# Patient Record
Sex: MALE | Race: AMERICAN INDIAN OR ALASKA NATIVE | NOT HISPANIC OR LATINO
[De-identification: names, ages, dates, MRNs, and addresses within clinical notes are randomized per-mention and may not be internally consistent; named-entity substitution may affect disease eponyms.]

---

## 2017-01-30 ENCOUNTER — RX RENEWAL (OUTPATIENT)
Age: 57
End: 2017-01-30

## 2017-02-01 ENCOUNTER — APPOINTMENT (OUTPATIENT)
Dept: OPHTHALMOLOGY | Facility: CLINIC | Age: 57
End: 2017-02-01

## 2017-03-02 ENCOUNTER — APPOINTMENT (OUTPATIENT)
Dept: VASCULAR SURGERY | Facility: CLINIC | Age: 57
End: 2017-03-02

## 2017-03-02 VITALS
BODY MASS INDEX: 29.55 KG/M2 | HEIGHT: 68 IN | HEART RATE: 71 BPM | DIASTOLIC BLOOD PRESSURE: 75 MMHG | TEMPERATURE: 98.6 F | WEIGHT: 195 LBS | SYSTOLIC BLOOD PRESSURE: 119 MMHG

## 2017-03-02 VITALS — DIASTOLIC BLOOD PRESSURE: 81 MMHG | RESPIRATION RATE: 73 BRPM | SYSTOLIC BLOOD PRESSURE: 124 MMHG

## 2017-03-02 DIAGNOSIS — I86.8 VARICOSE VEINS OF OTHER SPECIFIED SITES: ICD-10-CM

## 2017-04-28 ENCOUNTER — APPOINTMENT (OUTPATIENT)
Dept: INTERNAL MEDICINE | Facility: CLINIC | Age: 57
End: 2017-04-28

## 2017-04-28 VITALS
TEMPERATURE: 98.2 F | SYSTOLIC BLOOD PRESSURE: 120 MMHG | DIASTOLIC BLOOD PRESSURE: 84 MMHG | HEIGHT: 68 IN | HEART RATE: 82 BPM | RESPIRATION RATE: 16 BRPM | WEIGHT: 201 LBS | BODY MASS INDEX: 30.46 KG/M2

## 2017-04-29 DIAGNOSIS — H40.9 UNSPECIFIED GLAUCOMA: ICD-10-CM

## 2017-04-29 LAB
25(OH)D3 SERPL-MCNC: 55.6 NG/ML
ALBUMIN SERPL ELPH-MCNC: 4.3 G/DL
ALP BLD-CCNC: 73 U/L
ALT SERPL-CCNC: 18 U/L
ANION GAP SERPL CALC-SCNC: 14 MMOL/L
APPEARANCE: CLEAR
AST SERPL-CCNC: 19 U/L
BASOPHILS # BLD AUTO: 0.03 K/UL
BASOPHILS NFR BLD AUTO: 0.4 %
BILIRUB SERPL-MCNC: 0.6 MG/DL
BILIRUBIN URINE: NEGATIVE
BLOOD URINE: NEGATIVE
BUN SERPL-MCNC: 7 MG/DL
CALCIUM SERPL-MCNC: 9.4 MG/DL
CHLORIDE SERPL-SCNC: 101 MMOL/L
CHOLEST SERPL-MCNC: 150 MG/DL
CHOLEST/HDLC SERPL: 3.8 RATIO
CO2 SERPL-SCNC: 27 MMOL/L
COLOR: YELLOW
CREAT SERPL-MCNC: 0.93 MG/DL
CREAT SPEC-SCNC: 93 MG/DL
EOSINOPHIL # BLD AUTO: 0.1 K/UL
EOSINOPHIL NFR BLD AUTO: 1.5 %
ERYTHROCYTE [SEDIMENTATION RATE] IN BLOOD BY WESTERGREN METHOD: 12 MM/HR
FOLATE SERPL-MCNC: >20 NG/ML
GGT SERPL-CCNC: 14 U/L
GLUCOSE QUALITATIVE U: NORMAL MG/DL
GLUCOSE SERPL-MCNC: 91 MG/DL
HBA1C MFR BLD HPLC: 6 %
HCT VFR BLD CALC: 42.9 %
HDLC SERPL-MCNC: 40 MG/DL
HGB BLD-MCNC: 14.2 G/DL
IMM GRANULOCYTES NFR BLD AUTO: 0.1 %
IRON SATN MFR SERPL: 30 %
IRON SERPL-MCNC: 101 UG/DL
KETONES URINE: NEGATIVE
LDLC SERPL CALC-MCNC: 83 MG/DL
LEUKOCYTE ESTERASE URINE: NEGATIVE
LYMPHOCYTES # BLD AUTO: 3.23 K/UL
LYMPHOCYTES NFR BLD AUTO: 48.1 %
MAN DIFF?: NORMAL
MCHC RBC-ENTMCNC: 29.5 PG
MCHC RBC-ENTMCNC: 33.1 GM/DL
MCV RBC AUTO: 89 FL
MICROALBUMIN 24H UR DL<=1MG/L-MCNC: 0.4 MG/DL
MICROALBUMIN/CREAT 24H UR-RTO: 4 UG/MG
MONOCYTES # BLD AUTO: 0.39 K/UL
MONOCYTES NFR BLD AUTO: 5.8 %
NEUTROPHILS # BLD AUTO: 2.95 K/UL
NEUTROPHILS NFR BLD AUTO: 44.1 %
NITRITE URINE: NEGATIVE
PH URINE: 7
PLATELET # BLD AUTO: 197 K/UL
POTASSIUM SERPL-SCNC: 4.6 MMOL/L
PROT SERPL-MCNC: 7 G/DL
PROTEIN URINE: NEGATIVE MG/DL
PSA FREE FLD-MCNC: 13.1 %
PSA FREE SERPL-MCNC: 0.63 NG/ML
PSA SERPL-MCNC: 4.83 NG/ML
RBC # BLD: 4.82 M/UL
RBC # FLD: 13.6 %
SODIUM SERPL-SCNC: 142 MMOL/L
SPECIFIC GRAVITY URINE: 1.01
T3 SERPL-MCNC: 141 NG/DL
T4 FREE SERPL-MCNC: 1.2 NG/DL
TIBC SERPL-MCNC: 334 UG/DL
TRIGL SERPL-MCNC: 133 MG/DL
TSH SERPL-ACNC: 1.68 UIU/ML
UIBC SERPL-MCNC: 233 UG/DL
UROBILINOGEN URINE: NORMAL MG/DL
VIT B12 SERPL-MCNC: >2000 PG/ML
WBC # FLD AUTO: 6.71 K/UL

## 2017-05-03 LAB
TESTOST BND SERPL-MCNC: 3.8 PG/ML
TESTOST SERPL-MCNC: 401.3 NG/DL

## 2017-05-30 ENCOUNTER — APPOINTMENT (OUTPATIENT)
Dept: ORTHOPEDIC SURGERY | Facility: CLINIC | Age: 57
End: 2017-05-30

## 2017-05-30 ENCOUNTER — OUTPATIENT (OUTPATIENT)
Dept: OUTPATIENT SERVICES | Facility: HOSPITAL | Age: 57
LOS: 1 days | End: 2017-05-30
Payer: COMMERCIAL

## 2017-05-30 ENCOUNTER — APPOINTMENT (OUTPATIENT)
Dept: CARDIOLOGY | Facility: CLINIC | Age: 57
End: 2017-05-30

## 2017-05-30 VITALS
HEIGHT: 70 IN | SYSTOLIC BLOOD PRESSURE: 112 MMHG | OXYGEN SATURATION: 98 % | RESPIRATION RATE: 16 BRPM | DIASTOLIC BLOOD PRESSURE: 72 MMHG | BODY MASS INDEX: 28.35 KG/M2 | WEIGHT: 198 LBS | HEART RATE: 69 BPM

## 2017-05-30 VITALS — BODY MASS INDEX: 28.35 KG/M2 | WEIGHT: 198 LBS | HEIGHT: 70 IN

## 2017-05-30 VITALS — DIASTOLIC BLOOD PRESSURE: 71 MMHG | SYSTOLIC BLOOD PRESSURE: 104 MMHG | HEART RATE: 67 BPM

## 2017-05-30 DIAGNOSIS — Z00.8 ENCOUNTER FOR OTHER GENERAL EXAMINATION: ICD-10-CM

## 2017-05-30 DIAGNOSIS — M21.42 FLAT FOOT [PES PLANUS] (ACQUIRED), RIGHT FOOT: ICD-10-CM

## 2017-05-30 DIAGNOSIS — M21.41 FLAT FOOT [PES PLANUS] (ACQUIRED), RIGHT FOOT: ICD-10-CM

## 2017-05-30 PROCEDURE — G0463: CPT | Mod: 25

## 2017-05-30 PROCEDURE — 93005 ELECTROCARDIOGRAM TRACING: CPT

## 2017-05-30 PROCEDURE — 93010 ELECTROCARDIOGRAM REPORT: CPT

## 2017-06-01 DIAGNOSIS — I10 ESSENTIAL (PRIMARY) HYPERTENSION: ICD-10-CM

## 2017-06-01 DIAGNOSIS — R06.09 OTHER FORMS OF DYSPNEA: ICD-10-CM

## 2017-06-02 RX ORDER — DICLOFENAC SODIUM 10 MG/G
1 GEL TOPICAL
Qty: 1 | Refills: 1 | Status: ACTIVE | COMMUNITY
Start: 2017-06-02 | End: 1900-01-01

## 2017-06-14 ENCOUNTER — APPOINTMENT (OUTPATIENT)
Dept: OPHTHALMOLOGY | Facility: CLINIC | Age: 57
End: 2017-06-14

## 2017-06-27 ENCOUNTER — APPOINTMENT (OUTPATIENT)
Dept: OPHTHALMOLOGY | Facility: CLINIC | Age: 57
End: 2017-06-27

## 2017-06-30 ENCOUNTER — APPOINTMENT (OUTPATIENT)
Dept: MRI IMAGING | Facility: CLINIC | Age: 57
End: 2017-06-30

## 2017-06-30 ENCOUNTER — OUTPATIENT (OUTPATIENT)
Dept: OUTPATIENT SERVICES | Facility: HOSPITAL | Age: 57
LOS: 1 days | End: 2017-06-30
Payer: COMMERCIAL

## 2017-06-30 DIAGNOSIS — R97.20 ELEVATED PROSTATE SPECIFIC ANTIGEN [PSA]: ICD-10-CM

## 2017-06-30 PROCEDURE — 72197 MRI PELVIS W/O & W/DYE: CPT

## 2017-06-30 PROCEDURE — A9585: CPT

## 2017-06-30 PROCEDURE — 82565 ASSAY OF CREATININE: CPT

## 2017-07-01 ENCOUNTER — RX RENEWAL (OUTPATIENT)
Age: 57
End: 2017-07-01

## 2017-07-30 ENCOUNTER — APPOINTMENT (OUTPATIENT)
Dept: MRI IMAGING | Facility: HOSPITAL | Age: 57
End: 2017-07-30
Payer: COMMERCIAL

## 2017-07-30 ENCOUNTER — OUTPATIENT (OUTPATIENT)
Dept: OUTPATIENT SERVICES | Facility: HOSPITAL | Age: 57
LOS: 1 days | End: 2017-07-30
Payer: COMMERCIAL

## 2017-07-30 DIAGNOSIS — M76.822 POSTERIOR TIBIAL TENDINITIS, LEFT LEG: ICD-10-CM

## 2017-07-30 PROCEDURE — 73721 MRI JNT OF LWR EXTRE W/O DYE: CPT

## 2017-07-30 PROCEDURE — 73721 MRI JNT OF LWR EXTRE W/O DYE: CPT | Mod: 26,LT

## 2017-09-08 ENCOUNTER — OUTPATIENT (OUTPATIENT)
Dept: OUTPATIENT SERVICES | Facility: HOSPITAL | Age: 57
LOS: 1 days | Discharge: HOME | End: 2017-09-08
Payer: COMMERCIAL

## 2017-09-08 ENCOUNTER — RX RENEWAL (OUTPATIENT)
Age: 57
End: 2017-09-08

## 2017-09-08 VITALS
SYSTOLIC BLOOD PRESSURE: 124 MMHG | OXYGEN SATURATION: 99 % | DIASTOLIC BLOOD PRESSURE: 75 MMHG | HEART RATE: 70 BPM | TEMPERATURE: 98 F | WEIGHT: 199.3 LBS | HEIGHT: 70 IN | RESPIRATION RATE: 18 BRPM

## 2017-09-08 VITALS
DIASTOLIC BLOOD PRESSURE: 75 MMHG | HEART RATE: 70 BPM | OXYGEN SATURATION: 99 % | HEIGHT: 70 IN | TEMPERATURE: 98 F | WEIGHT: 199.3 LBS | RESPIRATION RATE: 18 BRPM | SYSTOLIC BLOOD PRESSURE: 124 MMHG

## 2017-09-08 DIAGNOSIS — M66.372 SPONTANEOUS RUPTURE OF FLEXOR TENDONS, LEFT ANKLE AND FOOT: ICD-10-CM

## 2017-09-08 DIAGNOSIS — Z98.890 OTHER SPECIFIED POSTPROCEDURAL STATES: Chronic | ICD-10-CM

## 2017-09-08 DIAGNOSIS — M76.822 POSTERIOR TIBIAL TENDINITIS, LEFT LEG: ICD-10-CM

## 2017-09-08 DIAGNOSIS — Z01.818 ENCOUNTER FOR OTHER PREPROCEDURAL EXAMINATION: ICD-10-CM

## 2017-09-08 DIAGNOSIS — I10 ESSENTIAL (PRIMARY) HYPERTENSION: ICD-10-CM

## 2017-09-08 DIAGNOSIS — H40.1130 PRIMARY OPEN-ANGLE GLAUCOMA, BILATERAL, STAGE UNSPECIFIED: ICD-10-CM

## 2017-09-08 DIAGNOSIS — M21.072 VALGUS DEFORMITY, NOT ELSEWHERE CLASSIFIED, LEFT ANKLE: ICD-10-CM

## 2017-09-08 DIAGNOSIS — N40.0 BENIGN PROSTATIC HYPERPLASIA WITHOUT LOWER URINARY TRACT SYMPTOMS: ICD-10-CM

## 2017-09-08 DIAGNOSIS — E78.5 HYPERLIPIDEMIA, UNSPECIFIED: ICD-10-CM

## 2017-09-08 DIAGNOSIS — R52 PAIN, UNSPECIFIED: ICD-10-CM

## 2017-09-08 LAB
ANION GAP SERPL CALC-SCNC: 7 MMOL/L — SIGNIFICANT CHANGE UP (ref 5–17)
BUN SERPL-MCNC: 8 MG/DL — SIGNIFICANT CHANGE UP (ref 7–23)
CALCIUM SERPL-MCNC: 9.2 MG/DL — SIGNIFICANT CHANGE UP (ref 8.5–10.1)
CHLORIDE SERPL-SCNC: 104 MMOL/L — SIGNIFICANT CHANGE UP (ref 96–108)
CO2 SERPL-SCNC: 28 MMOL/L — SIGNIFICANT CHANGE UP (ref 22–31)
CREAT SERPL-MCNC: 0.88 MG/DL — SIGNIFICANT CHANGE UP (ref 0.5–1.3)
GLUCOSE SERPL-MCNC: 94 MG/DL — SIGNIFICANT CHANGE UP (ref 70–99)
HCT VFR BLD CALC: 46.8 % — SIGNIFICANT CHANGE UP (ref 39–50)
HGB BLD-MCNC: 15.4 G/DL — SIGNIFICANT CHANGE UP (ref 13–17)
MCHC RBC-ENTMCNC: 30.9 PG — SIGNIFICANT CHANGE UP (ref 27–34)
MCHC RBC-ENTMCNC: 33 GM/DL — SIGNIFICANT CHANGE UP (ref 32–36)
MCV RBC AUTO: 93.6 FL — SIGNIFICANT CHANGE UP (ref 80–100)
PLATELET # BLD AUTO: 178 K/UL — SIGNIFICANT CHANGE UP (ref 150–400)
POTASSIUM SERPL-MCNC: 4.7 MMOL/L — SIGNIFICANT CHANGE UP (ref 3.5–5.3)
POTASSIUM SERPL-SCNC: 4.7 MMOL/L — SIGNIFICANT CHANGE UP (ref 3.5–5.3)
RBC # BLD: 5 M/UL — SIGNIFICANT CHANGE UP (ref 4.2–5.8)
RBC # FLD: 12.1 % — SIGNIFICANT CHANGE UP (ref 11–15)
SODIUM SERPL-SCNC: 139 MMOL/L — SIGNIFICANT CHANGE UP (ref 135–145)
WBC # BLD: 6.2 K/UL — SIGNIFICANT CHANGE UP (ref 3.8–10.5)
WBC # FLD AUTO: 6.2 K/UL — SIGNIFICANT CHANGE UP (ref 3.8–10.5)

## 2017-09-08 PROCEDURE — 93010 ELECTROCARDIOGRAM REPORT: CPT | Mod: NC

## 2017-09-08 RX ORDER — ASPIRIN/CALCIUM CARB/MAGNESIUM 324 MG
2 TABLET ORAL
Qty: 0 | Refills: 0 | COMMUNITY

## 2017-09-08 NOTE — H&P PST ADULT - NSANTHOSAYNRD_GEN_A_CORE
No. LIZ screening performed.  STOP BANG Legend: 0-2 = LOW Risk; 3-4 = INTERMEDIATE Risk; 5-8 = HIGH Risk

## 2017-09-08 NOTE — H&P PST ADULT - PMH
Benign prostatic hyperplasia, presence of lower urinary tract symptoms unspecified    Essential hypertension    Hyperlipidemia, unspecified hyperlipidemia type    Primary open angle glaucoma of both eyes, unspecified glaucoma stage

## 2017-09-08 NOTE — H&P PST ADULT - HISTORY OF PRESENT ILLNESS
57 year old male, PMH HTN, HLD, BPH, Glaucoma presents with left ankle pain 8/10 intermittent and started 6 months ago. pain is aggravated by ambulation.

## 2017-09-11 ENCOUNTER — TRANSCRIPTION ENCOUNTER (OUTPATIENT)
Age: 57
End: 2017-09-11

## 2017-09-11 RX ORDER — MELOXICAM 7.5 MG/1
7.5 TABLET ORAL
Qty: 30 | Refills: 0 | Status: DISCONTINUED | COMMUNITY
Start: 2017-05-30 | End: 2017-09-11

## 2017-09-18 ENCOUNTER — INPATIENT (INPATIENT)
Facility: HOSPITAL | Age: 57
LOS: 2 days | Discharge: HOME | End: 2017-09-21
Attending: INTERNAL MEDICINE | Admitting: INTERNAL MEDICINE
Payer: COMMERCIAL

## 2017-09-18 ENCOUNTER — RESULT REVIEW (OUTPATIENT)
Age: 57
End: 2017-09-18

## 2017-09-18 VITALS
HEIGHT: 67 IN | RESPIRATION RATE: 18 BRPM | OXYGEN SATURATION: 100 % | HEART RATE: 74 BPM | DIASTOLIC BLOOD PRESSURE: 71 MMHG | TEMPERATURE: 98 F | SYSTOLIC BLOOD PRESSURE: 135 MMHG | WEIGHT: 199.3 LBS

## 2017-09-18 DIAGNOSIS — Z98.890 OTHER SPECIFIED POSTPROCEDURAL STATES: Chronic | ICD-10-CM

## 2017-09-18 PROCEDURE — 88311 DECALCIFY TISSUE: CPT | Mod: 26

## 2017-09-18 PROCEDURE — 88304 TISSUE EXAM BY PATHOLOGIST: CPT | Mod: 26

## 2017-09-18 RX ORDER — CEFAZOLIN SODIUM 1 G
1000 VIAL (EA) INJECTION EVERY 8 HOURS
Qty: 0 | Refills: 0 | Status: DISCONTINUED | OUTPATIENT
Start: 2017-09-19 | End: 2017-09-21

## 2017-09-18 RX ORDER — OXYCODONE AND ACETAMINOPHEN 5; 325 MG/1; MG/1
1 TABLET ORAL EVERY 4 HOURS
Qty: 0 | Refills: 0 | Status: DISCONTINUED | OUTPATIENT
Start: 2017-09-18 | End: 2017-09-21

## 2017-09-18 RX ORDER — MORPHINE SULFATE 50 MG/1
2 CAPSULE, EXTENDED RELEASE ORAL EVERY 4 HOURS
Qty: 0 | Refills: 0 | Status: DISCONTINUED | OUTPATIENT
Start: 2017-09-18 | End: 2017-09-19

## 2017-09-18 RX ORDER — ACETAMINOPHEN 500 MG
650 TABLET ORAL EVERY 6 HOURS
Qty: 0 | Refills: 0 | Status: DISCONTINUED | OUTPATIENT
Start: 2017-09-18 | End: 2017-09-18

## 2017-09-18 RX ORDER — ACETAMINOPHEN 500 MG
1000 TABLET ORAL ONCE
Qty: 0 | Refills: 0 | Status: COMPLETED | OUTPATIENT
Start: 2017-09-18 | End: 2017-09-19

## 2017-09-18 RX ORDER — ONDANSETRON 8 MG/1
4 TABLET, FILM COATED ORAL ONCE
Qty: 0 | Refills: 0 | Status: DISCONTINUED | OUTPATIENT
Start: 2017-09-18 | End: 2017-09-19

## 2017-09-18 RX ORDER — MORPHINE SULFATE 50 MG/1
2 CAPSULE, EXTENDED RELEASE ORAL EVERY 6 HOURS
Qty: 0 | Refills: 0 | Status: DISCONTINUED | OUTPATIENT
Start: 2017-09-18 | End: 2017-09-18

## 2017-09-18 RX ORDER — CEFAZOLIN SODIUM 1 G
VIAL (EA) INJECTION
Qty: 0 | Refills: 0 | Status: DISCONTINUED | OUTPATIENT
Start: 2017-09-19 | End: 2017-09-21

## 2017-09-18 RX ORDER — MORPHINE SULFATE 50 MG/1
4 CAPSULE, EXTENDED RELEASE ORAL ONCE
Qty: 0 | Refills: 0 | Status: DISCONTINUED | OUTPATIENT
Start: 2017-09-18 | End: 2017-09-18

## 2017-09-18 RX ORDER — FENTANYL CITRATE 50 UG/ML
50 INJECTION INTRAVENOUS
Qty: 0 | Refills: 0 | Status: DISCONTINUED | OUTPATIENT
Start: 2017-09-18 | End: 2017-09-19

## 2017-09-18 RX ORDER — FENTANYL CITRATE 50 UG/ML
25 INJECTION INTRAVENOUS
Qty: 0 | Refills: 0 | Status: DISCONTINUED | OUTPATIENT
Start: 2017-09-18 | End: 2017-09-19

## 2017-09-18 RX ORDER — CHOLECALCIFEROL (VITAMIN D3) 125 MCG
1 CAPSULE ORAL
Qty: 0 | Refills: 0 | COMMUNITY

## 2017-09-18 RX ORDER — SODIUM CHLORIDE 9 MG/ML
3 INJECTION INTRAMUSCULAR; INTRAVENOUS; SUBCUTANEOUS EVERY 8 HOURS
Qty: 0 | Refills: 0 | Status: DISCONTINUED | OUTPATIENT
Start: 2017-09-18 | End: 2017-09-18

## 2017-09-18 RX ORDER — SODIUM CHLORIDE 9 MG/ML
1000 INJECTION, SOLUTION INTRAVENOUS
Qty: 0 | Refills: 0 | Status: DISCONTINUED | OUTPATIENT
Start: 2017-09-18 | End: 2017-09-19

## 2017-09-18 RX ORDER — CEFAZOLIN SODIUM 1 G
1000 VIAL (EA) INJECTION ONCE
Qty: 0 | Refills: 0 | Status: COMPLETED | OUTPATIENT
Start: 2017-09-18 | End: 2017-09-19

## 2017-09-18 RX ADMIN — SODIUM CHLORIDE 100 MILLILITER(S): 9 INJECTION, SOLUTION INTRAVENOUS at 23:30

## 2017-09-18 NOTE — PROVIDER CONTACT NOTE (OTHER) - BACKGROUND
Patient is s/p left foot Medical calcaneal slide and posterior tendon advancement and augmentation with FDL and tenodesis

## 2017-09-18 NOTE — BRIEF OPERATIVE NOTE - OPERATION/FINDINGS
Left foot calcaneal slide with 1 x 7.5 45mm Integra screw fixation, Left foot accessory navicular excision and FDL tendon augmentation with 5.5 Arthrex tenodesis screw interposition and reapproximation and advancement of PT tendon

## 2017-09-19 DIAGNOSIS — S99.922S UNSPECIFIED INJURY OF LEFT FOOT, SEQUELA: ICD-10-CM

## 2017-09-19 DIAGNOSIS — I10 ESSENTIAL (PRIMARY) HYPERTENSION: ICD-10-CM

## 2017-09-19 PROCEDURE — 73630 X-RAY EXAM OF FOOT: CPT | Mod: 26,LT

## 2017-09-19 RX ORDER — AMLODIPINE BESYLATE 2.5 MG/1
5 TABLET ORAL DAILY
Qty: 0 | Refills: 0 | Status: DISCONTINUED | OUTPATIENT
Start: 2017-09-19 | End: 2017-09-21

## 2017-09-19 RX ORDER — ENOXAPARIN SODIUM 100 MG/ML
40 INJECTION SUBCUTANEOUS
Qty: 0 | Refills: 0 | Status: DISCONTINUED | OUTPATIENT
Start: 2017-09-19 | End: 2017-09-19

## 2017-09-19 RX ORDER — ATORVASTATIN CALCIUM 80 MG/1
10 TABLET, FILM COATED ORAL AT BEDTIME
Qty: 0 | Refills: 0 | Status: DISCONTINUED | OUTPATIENT
Start: 2017-09-19 | End: 2017-09-21

## 2017-09-19 RX ORDER — LATANOPROST 0.05 MG/ML
1 SOLUTION/ DROPS OPHTHALMIC; TOPICAL AT BEDTIME
Qty: 0 | Refills: 0 | Status: DISCONTINUED | OUTPATIENT
Start: 2017-09-19 | End: 2017-09-21

## 2017-09-19 RX ORDER — MORPHINE SULFATE 50 MG/1
4 CAPSULE, EXTENDED RELEASE ORAL EVERY 6 HOURS
Qty: 0 | Refills: 0 | Status: DISCONTINUED | OUTPATIENT
Start: 2017-09-19 | End: 2017-09-21

## 2017-09-19 RX ORDER — TAMSULOSIN HYDROCHLORIDE 0.4 MG/1
0.4 CAPSULE ORAL AT BEDTIME
Qty: 0 | Refills: 0 | Status: DISCONTINUED | OUTPATIENT
Start: 2017-09-19 | End: 2017-09-21

## 2017-09-19 RX ORDER — ENOXAPARIN SODIUM 100 MG/ML
40 INJECTION SUBCUTANEOUS DAILY
Qty: 0 | Refills: 0 | Status: DISCONTINUED | OUTPATIENT
Start: 2017-09-19 | End: 2017-09-21

## 2017-09-19 RX ADMIN — MORPHINE SULFATE 2 MILLIGRAM(S): 50 CAPSULE, EXTENDED RELEASE ORAL at 13:45

## 2017-09-19 RX ADMIN — Medication 100 MILLIGRAM(S): at 01:14

## 2017-09-19 RX ADMIN — FENTANYL CITRATE 50 MICROGRAM(S): 50 INJECTION INTRAVENOUS at 00:22

## 2017-09-19 RX ADMIN — Medication 100 MILLIGRAM(S): at 13:11

## 2017-09-19 RX ADMIN — TAMSULOSIN HYDROCHLORIDE 0.4 MILLIGRAM(S): 0.4 CAPSULE ORAL at 21:04

## 2017-09-19 RX ADMIN — MORPHINE SULFATE 2 MILLIGRAM(S): 50 CAPSULE, EXTENDED RELEASE ORAL at 07:46

## 2017-09-19 RX ADMIN — Medication 500 MILLIGRAM(S): at 21:48

## 2017-09-19 RX ADMIN — Medication 100 MILLIGRAM(S): at 21:50

## 2017-09-19 RX ADMIN — MORPHINE SULFATE 2 MILLIGRAM(S): 50 CAPSULE, EXTENDED RELEASE ORAL at 08:10

## 2017-09-19 RX ADMIN — MORPHINE SULFATE 2 MILLIGRAM(S): 50 CAPSULE, EXTENDED RELEASE ORAL at 03:24

## 2017-09-19 RX ADMIN — MORPHINE SULFATE 4 MILLIGRAM(S): 50 CAPSULE, EXTENDED RELEASE ORAL at 21:03

## 2017-09-19 RX ADMIN — MORPHINE SULFATE 2 MILLIGRAM(S): 50 CAPSULE, EXTENDED RELEASE ORAL at 14:15

## 2017-09-19 RX ADMIN — Medication 100 MILLIGRAM(S): at 04:33

## 2017-09-19 RX ADMIN — MORPHINE SULFATE 4 MILLIGRAM(S): 50 CAPSULE, EXTENDED RELEASE ORAL at 00:47

## 2017-09-19 RX ADMIN — Medication 500 MILLIGRAM(S): at 22:48

## 2017-09-19 RX ADMIN — FENTANYL CITRATE 50 MICROGRAM(S): 50 INJECTION INTRAVENOUS at 00:48

## 2017-09-19 RX ADMIN — MORPHINE SULFATE 4 MILLIGRAM(S): 50 CAPSULE, EXTENDED RELEASE ORAL at 01:00

## 2017-09-19 RX ADMIN — MORPHINE SULFATE 2 MILLIGRAM(S): 50 CAPSULE, EXTENDED RELEASE ORAL at 02:28

## 2017-09-19 RX ADMIN — Medication 400 MILLIGRAM(S): at 00:10

## 2017-09-19 RX ADMIN — ATORVASTATIN CALCIUM 10 MILLIGRAM(S): 80 TABLET, FILM COATED ORAL at 21:50

## 2017-09-19 RX ADMIN — Medication 1000 MILLIGRAM(S): at 00:27

## 2017-09-19 RX ADMIN — MORPHINE SULFATE 4 MILLIGRAM(S): 50 CAPSULE, EXTENDED RELEASE ORAL at 21:18

## 2017-09-19 RX ADMIN — LATANOPROST 1 DROP(S): 0.05 SOLUTION/ DROPS OPHTHALMIC; TOPICAL at 21:48

## 2017-09-19 RX ADMIN — ENOXAPARIN SODIUM 40 MILLIGRAM(S): 100 INJECTION SUBCUTANEOUS at 18:42

## 2017-09-19 NOTE — PROGRESS NOTE ADULT - SUBJECTIVE AND OBJECTIVE BOX
Patient is a 57y old  Male who presents with a chief complaint of left foot pain (18 Sep 2017 12:10)      INTERVAL HPI/OVERNIGHT EVENTS:  no new episode  MEDICATIONS  (STANDING):  ceFAZolin   IVPB      ceFAZolin   IVPB 1000 milliGRAM(s) IV Intermittent every 8 hours  amLODIPine   Tablet 5 milliGRAM(s) Oral daily  tamsulosin 0.4 milliGRAM(s) Oral at bedtime  atorvastatin 10 milliGRAM(s) Oral at bedtime  latanoprost 0.005% Ophthalmic Solution 1 Drop(s) Both EYES at bedtime  multivitamin 1 Tablet(s) Oral daily    MEDICATIONS  (PRN):  oxyCODONE    5 mG/acetaminophen 325 mG 1 Tablet(s) Oral every 4 hours PRN Moderate Pain (4 - 6)  morphine  - Injectable 2 milliGRAM(s) IV Push every 4 hours PRN Severe Pain (7 - 10)      Allergies    No Known Allergies    Intolerances        REVIEW OF SYSTEMS:  CONSTITUTIONAL: No fever, weight loss, or fatigue  EYES: No eye pain, visual disturbances, or discharge  ENMT:  No difficulty hearing, tinnitus, vertigo; No sinus or throat pain  NECK: No pain or stiffness  BREASTS: No pain, masses, or nipple discharge  RESPIRATORY: No cough, wheezing, chills or hemoptysis; No shortness of breath  CARDIOVASCULAR: No chest pain, palpitations, dizziness, or leg swelling  GASTROINTESTINAL: No abdominal or epigastric pain. No nausea, vomiting, or hematemesis; No diarrhea or constipation. No melena or hematochezia.  GENITOURINARY: No dysuria, frequency, hematuria, or incontinence  NEUROLOGICAL: No headaches, memory loss, loss of strength, numbness, or tremors  SKIN: No itching, burning, rashes, or lesions   LYMPH NODES: No enlarged glands  ENDOCRINE: No heat or cold intolerance; No hair loss  MUSCULOSKELETAL: No joint pain or swelling; No muscle, back, or extremity pain  PSYCHIATRIC: No depression, anxiety, mood swings, or difficulty sleeping  HEME/LYMPH: No easy bruising, or bleeding gums  ALLERGY AND IMMUNOLOGIC: No hives or eczema    Vital Signs Last 24 Hrs  T(C): 36 (19 Sep 2017 11:28), Max: 36.5 (18 Sep 2017 16:02)  T(F): 96.8 (19 Sep 2017 11:28), Max: 97.7 (18 Sep 2017 16:02)  HR: 69 (19 Sep 2017 11:28) (69 - 102)  BP: 122/66 (19 Sep 2017 11:28) (104/52 - 135/71)  BP(mean): --  RR: 16 (19 Sep 2017 11:28) (13 - 27)  SpO2: 98% (19 Sep 2017 11:28) (95% - 100%)    PHYSICAL EXAM:  GENERAL: NAD, well-groomed, well-developed  HEAD:  Atraumatic, Normocephalic  EYES: EOMI, PERRLA, conjunctiva and sclera clear  ENMT: No tonsillar erythema, exudates, or enlargement; Moist mucous membranes, Good dentition, No lesions  NECK: Supple, No JVD, Normal thyroid  NERVOUS SYSTEM:  Alert & Oriented X3, Good concentration; Motor Strength 5/5 B/L upper and lower extremities; DTRs 2+ intact and symmetric  CHEST/LUNG: Clear to percussion bilaterally; No rales, rhonchi, wheezing, or rubs  HEART: Regular rate and rhythm; No murmurs, rubs, or gallops  ABDOMEN: Soft, Nontender, Nondistended; Bowel sounds present  EXTREMITIES:  2+ Peripheral Pulses, No clubbing, cyanosis, or edema  LYMPH: No lymphadenopathy noted  SKIN: No rashes or lesions    LABS:              CAPILLARY BLOOD GLUCOSE        CULTURES:    HEMOGLOBIN A1C:    CHOLESTEROL:        RADIOLOGY & ADDITIONAL TESTS:

## 2017-09-19 NOTE — PHYSICAL THERAPY INITIAL EVALUATION ADULT - GENERAL OBSERVATIONS, REHAB EVAL
Chart reviewed and Event noted. Pt was seen in supine c short leg cast in LLE, alert and Ox4. Wife at bedside.

## 2017-09-19 NOTE — PHYSICAL THERAPY INITIAL EVALUATION ADULT - MODIFIED CLINICAL TEST OF SENSORY INTEGRATION IN BALANCE TEST
Barthel Index: Feeding Score 10_/10__, Bathing Score 0_/5__, Grooming Score 5_/5__, Dressing Score _5/10__, Bowels Score _10_/10_, Bladder Score 10_/10__, Toilet Score 5_/10__, Transfers Score _10/15__, Mobility Score _0_/15_, Stairs Score 5_/10__,     Total Score _55__/100

## 2017-09-19 NOTE — PHYSICAL THERAPY INITIAL EVALUATION ADULT - CRITERIA FOR SKILLED THERAPEUTIC INTERVENTIONS
impairments found/risk reduction/prevention/functional limitations in following categories/therapy frequency/anticipated equipment needs at discharge

## 2017-09-20 ENCOUNTER — TRANSCRIPTION ENCOUNTER (OUTPATIENT)
Age: 57
End: 2017-09-20

## 2017-09-20 RX ORDER — MAGNESIUM HYDROXIDE 400 MG/1
30 TABLET, CHEWABLE ORAL ONCE
Qty: 0 | Refills: 0 | Status: COMPLETED | OUTPATIENT
Start: 2017-09-20 | End: 2017-09-20

## 2017-09-20 RX ORDER — DIPHENHYDRAMINE HCL 50 MG
50 CAPSULE ORAL ONCE
Qty: 0 | Refills: 0 | Status: COMPLETED | OUTPATIENT
Start: 2017-09-20 | End: 2017-09-20

## 2017-09-20 RX ORDER — DOCUSATE SODIUM 100 MG
100 CAPSULE ORAL
Qty: 0 | Refills: 0 | Status: DISCONTINUED | OUTPATIENT
Start: 2017-09-20 | End: 2017-09-21

## 2017-09-20 RX ORDER — ENOXAPARIN SODIUM 100 MG/ML
1 INJECTION SUBCUTANEOUS
Qty: 1 | Refills: 0 | OUTPATIENT
Start: 2017-09-20 | End: 2017-10-04

## 2017-09-20 RX ORDER — ACETAMINOPHEN 500 MG
1000 TABLET ORAL ONCE
Qty: 0 | Refills: 0 | Status: COMPLETED | OUTPATIENT
Start: 2017-09-20 | End: 2017-09-20

## 2017-09-20 RX ADMIN — Medication 500 MILLIGRAM(S): at 09:12

## 2017-09-20 RX ADMIN — MORPHINE SULFATE 4 MILLIGRAM(S): 50 CAPSULE, EXTENDED RELEASE ORAL at 04:00

## 2017-09-20 RX ADMIN — MORPHINE SULFATE 4 MILLIGRAM(S): 50 CAPSULE, EXTENDED RELEASE ORAL at 23:28

## 2017-09-20 RX ADMIN — Medication 500 MILLIGRAM(S): at 21:40

## 2017-09-20 RX ADMIN — Medication 100 MILLIGRAM(S): at 05:42

## 2017-09-20 RX ADMIN — LATANOPROST 1 DROP(S): 0.05 SOLUTION/ DROPS OPHTHALMIC; TOPICAL at 20:52

## 2017-09-20 RX ADMIN — Medication 1000 MILLIGRAM(S): at 01:08

## 2017-09-20 RX ADMIN — Medication 100 MILLIGRAM(S): at 21:20

## 2017-09-20 RX ADMIN — Medication 400 MILLIGRAM(S): at 00:43

## 2017-09-20 RX ADMIN — Medication 500 MILLIGRAM(S): at 08:12

## 2017-09-20 RX ADMIN — MORPHINE SULFATE 4 MILLIGRAM(S): 50 CAPSULE, EXTENDED RELEASE ORAL at 23:13

## 2017-09-20 RX ADMIN — TAMSULOSIN HYDROCHLORIDE 0.4 MILLIGRAM(S): 0.4 CAPSULE ORAL at 20:54

## 2017-09-20 RX ADMIN — MAGNESIUM HYDROXIDE 30 MILLILITER(S): 400 TABLET, CHEWABLE ORAL at 20:39

## 2017-09-20 RX ADMIN — MORPHINE SULFATE 4 MILLIGRAM(S): 50 CAPSULE, EXTENDED RELEASE ORAL at 12:01

## 2017-09-20 RX ADMIN — MORPHINE SULFATE 4 MILLIGRAM(S): 50 CAPSULE, EXTENDED RELEASE ORAL at 11:25

## 2017-09-20 RX ADMIN — Medication 100 MILLIGRAM(S): at 20:52

## 2017-09-20 RX ADMIN — AMLODIPINE BESYLATE 5 MILLIGRAM(S): 2.5 TABLET ORAL at 05:41

## 2017-09-20 RX ADMIN — Medication 100 MILLIGRAM(S): at 13:27

## 2017-09-20 RX ADMIN — Medication 50 MILLIGRAM(S): at 23:43

## 2017-09-20 RX ADMIN — ENOXAPARIN SODIUM 40 MILLIGRAM(S): 100 INJECTION SUBCUTANEOUS at 11:25

## 2017-09-20 RX ADMIN — MORPHINE SULFATE 4 MILLIGRAM(S): 50 CAPSULE, EXTENDED RELEASE ORAL at 03:45

## 2017-09-20 RX ADMIN — ATORVASTATIN CALCIUM 10 MILLIGRAM(S): 80 TABLET, FILM COATED ORAL at 20:52

## 2017-09-20 RX ADMIN — Medication 1 TABLET(S): at 11:25

## 2017-09-20 RX ADMIN — Medication 500 MILLIGRAM(S): at 20:41

## 2017-09-20 NOTE — DISCHARGE NOTE ADULT - CARE PLAN
Goal:	resolution of left foot post-op pain  Instructions for follow-up, activity and diet:	WOUNDCARE: Keep left foot dressing and splint clean, dry, and intact until follow-up with Dr. Rivera  WEIGHTBEARING: Non-weight bearing to left foot using crutches.  FOLLOW-UP: Follow-up with Dr. Rivera within 1 week of discharge. [phone] or [phone]. Principal Discharge DX:	Injury of left foot, sequela  Goal:	resolution of left foot post-op pain  Instructions for follow-up, activity and diet:	WOUNDCARE: Keep left foot dressing and splint clean, dry, and intact until follow-up with Dr. Rivera  WEIGHTBEARING: Non-weight bearing to left foot using crutches.  Do not drive while using pain medications.  Wound care and bathing per podiatry instructions  Continue taking abx for 5 days and Lovenox for the next 14 days. Hold aspirin while using Lovenox.   FOLLOW-UP: Follow-up with Dr. Rivera within 1 week of discharge. [phone] or [phone].  Secondary Diagnosis:	Benign prostatic hyperplasia, presence of lower urinary tract symptoms unspecified  Instructions for follow-up, activity and diet:	Cont. Home medication and follow up with PCP  Secondary Diagnosis:	Essential hypertension  Instructions for follow-up, activity and diet:	Cont. Home medications and follow up with PCP  Secondary Diagnosis:	Hyperlipidemia, unspecified hyperlipidemia type  Instructions for follow-up, activity and diet:	Cont. HLD medication and follow up with PCP.  Secondary Diagnosis:	Primary open angle glaucoma of both eyes, unspecified glaucoma stage  Instructions for follow-up, activity and diet:	Cont. latanoprost eye drops

## 2017-09-20 NOTE — DISCHARGE NOTE ADULT - NS AS ACTIVITY OBS
Walking-Outdoors allowed/Do not drive or operate machinery/No Heavy lifting/straining/Walking-Indoors allowed/Do not make important decisions

## 2017-09-20 NOTE — DISCHARGE NOTE ADULT - MEDICATION SUMMARY - MEDICATIONS TO TAKE
I will START or STAY ON the medications listed below when I get home from the hospital:    oxyCODONE-acetaminophen 5 mg-325 mg oral tablet  -- 1 tab(s) by mouth every 6 hours, As Needed -for moderate pain MDD:4   -- Caution federal law prohibits the transfer of this drug to any person other  than the person for whom it was prescribed.  May cause drowsiness.  Alcohol may intensify this effect.  Use care when operating dangerous machinery.  This prescription cannot be refilled.  This product contains acetaminophen.  Do not use  with any other product containing acetaminophen to prevent possible liver damage.  Using more of this medication than prescribed may cause serious breathing problems.    -- Indication: For postop pain    tamsulosin 0.4 mg oral capsule  -- 1 cap(s) by mouth once a day (at bedtime)  -- Indication: For BPH    Lipitor 10 mg oral tablet  -- 1 tab(s) by mouth once a day (at bedtime)  -- Indication: For HLD    amLODIPine 5 mg oral tablet  -- 1 tab(s) by mouth once a day  -- Indication: For HTN    cefadroxil 500 mg oral capsule  -- 1 cap(s) by mouth 2 times a day   -- Finish all this medication unless otherwise directed by prescriber.    -- Indication: For LEFT HEEL CALCANEAL OSTEOTOMY FLEXOR TENDON TRANSFER LEFT H    latanoprost 0.005% ophthalmic solution  -- 1 drop(s) to each affected eye once a day (in the evening)  -- Indication: For glaucoma    multivitamin  -- 1 tab(s) by mouth once a day  -- Indication: For supplement    Vitamin B-12  -- 1 tab(s) by mouth once a day  -- Indication: For supplement    Vitamin C 1000 mg oral tablet  -- 1 tab(s) by mouth once a day  -- Indication: For supplement    Vitamin D3 5000 intl units oral capsule  -- 1 cap(s) by mouth once a day  -- Indication: For supplement

## 2017-09-20 NOTE — DISCHARGE NOTE ADULT - HOSPITAL COURSE
56 yo Male who was admitted for left foot surgery secondary to left foot injury. Patient underwent tendonesis, without complications. Postoperatively patient did well, pain controlled, tolerating regular diet and is now hemodynamically stable for d/c home to follow up with podiatry.

## 2017-09-20 NOTE — DISCHARGE NOTE ADULT - PLAN OF CARE
resolution of left foot post-op pain WOUNDCARE: Keep left foot dressing and splint clean, dry, and intact until follow-up with Dr. Rivera  WEIGHTBEARING: Non-weight bearing to left foot using crutches.  FOLLOW-UP: Follow-up with Dr. Rivera within 1 week of discharge. [phone] or [phone]. WOUNDCARE: Keep left foot dressing and splint clean, dry, and intact until follow-up with Dr. Rivera  WEIGHTBEARING: Non-weight bearing to left foot using crutches.  Do not drive while using pain medications.  Wound care and bathing per podiatry instructions  Continue taking abx for 5 days and Lovenox for the next 14 days. Hold aspirin while using Lovenox.   FOLLOW-UP: Follow-up with Dr. Rivera within 1 week of discharge. [phone] or [phone]. Cont. Home medication and follow up with PCP Cont. Home medications and follow up with PCP Cont. HLD medication and follow up with PCP. Cont. latanoprost eye drops

## 2017-09-20 NOTE — DISCHARGE NOTE ADULT - PATIENT PORTAL LINK FT
“You can access the [facility] Patient Portal, offered by [facility], by registering with the following website: [email or web address]”

## 2017-09-20 NOTE — PROGRESS NOTE ADULT - ASSESSMENT
57M left foot flatfoot recon  - Pt seen and examined  - Bi-valve cast removed by Dr. Rivera last night, second posterior splint applied  - Pain to LLE is improving (9.5 ->7.5-8) but still persists, managed with morphine  - No signs for compartment syndrome  - Can be d/c when pain controlled with PO meds  - Will monitor  - D/w attending

## 2017-09-20 NOTE — PROGRESS NOTE ADULT - SUBJECTIVE AND OBJECTIVE BOX
Patient is a 57y old  Male who presents with a chief complaint of left foot pain (18 Sep 2017 12:10)      INTERVAL HPI/OVERNIGHT EVENTS:  pt still having pain at the incision site  MEDICATIONS  (STANDING):  ceFAZolin   IVPB      ceFAZolin   IVPB 1000 milliGRAM(s) IV Intermittent every 8 hours  amLODIPine   Tablet 5 milliGRAM(s) Oral daily  tamsulosin 0.4 milliGRAM(s) Oral at bedtime  atorvastatin 10 milliGRAM(s) Oral at bedtime  latanoprost 0.005% Ophthalmic Solution 1 Drop(s) Both EYES at bedtime  multivitamin 1 Tablet(s) Oral daily  enoxaparin Injectable 40 milliGRAM(s) SubCutaneous daily  naproxen 500 milliGRAM(s) Oral two times a day    MEDICATIONS  (PRN):  oxyCODONE    5 mG/acetaminophen 325 mG 1 Tablet(s) Oral every 4 hours PRN Moderate Pain (4 - 6)  morphine  - Injectable 4 milliGRAM(s) IV Push every 6 hours PRN Severe Pain (7 - 10)      Allergies    No Known Allergies    Intolerances        REVIEW OF SYSTEMS:  CONSTITUTIONAL: No fever, weight loss, or fatigue  EYES: No eye pain, visual disturbances, or discharge  ENMT:  No difficulty hearing, tinnitus, vertigo; No sinus or throat pain  NECK: No pain or stiffness  BREASTS: No pain, masses, or nipple discharge  RESPIRATORY: No cough, wheezing, chills or hemoptysis; No shortness of breath  CARDIOVASCULAR: No chest pain, palpitations, dizziness, or leg swelling  GASTROINTESTINAL: No abdominal or epigastric pain. No nausea, vomiting, or hematemesis; No diarrhea or constipation. No melena or hematochezia.  GENITOURINARY: No dysuria, frequency, hematuria, or incontinence  NEUROLOGICAL: No headaches, memory loss, loss of strength, numbness, or tremors  SKIN: No itching, burning, rashes, or lesions   LYMPH NODES: No enlarged glands  ENDOCRINE: No heat or cold intolerance; No hair loss  MUSCULOSKELETAL: No joint pain or swelling; No muscle, back, or extremity pain  PSYCHIATRIC: No depression, anxiety, mood swings, or difficulty sleeping  HEME/LYMPH: No easy bruising, or bleeding gums  ALLERGY AND IMMUNOLOGIC: No hives or eczema    Vital Signs Last 24 Hrs  T(C): 36.2 (20 Sep 2017 05:30), Max: 37.7 (19 Sep 2017 21:10)  T(F): 97.1 (20 Sep 2017 05:30), Max: 99.8 (19 Sep 2017 21:10)  HR: 82 (20 Sep 2017 11:18) (69 - 83)  BP: 121/56 (20 Sep 2017 11:18) (116/66 - 129/72)  BP(mean): --  RR: 16 (20 Sep 2017 11:18) (16 - 17)  SpO2: 100% (20 Sep 2017 11:18) (93% - 100%)    PHYSICAL EXAM:  GENERAL: NAD, well-groomed, well-developed  HEAD:  Atraumatic, Normocephalic  EYES: EOMI, PERRLA, conjunctiva and sclera clear  ENMT: No tonsillar erythema, exudates, or enlargement; Moist mucous membranes, Good dentition, No lesions  NECK: Supple, No JVD, Normal thyroid  NERVOUS SYSTEM:  Alert & Oriented X3, Good concentration; Motor Strength 5/5 B/L upper and lower extremities; DTRs 2+ intact and symmetric  CHEST/LUNG: Clear to percussion bilaterally; No rales, rhonchi, wheezing, or rubs  HEART: Regular rate and rhythm; No murmurs, rubs, or gallops  ABDOMEN: Soft, Nontender, Nondistended; Bowel sounds present  EXTREMITIES:  2+ Peripheral Pulses, No clubbing, cyanosis, or edema  LYMPH: No lymphadenopathy noted  SKIN: No rashes or lesions    LABS:              CAPILLARY BLOOD GLUCOSE        CULTURES:    HEMOGLOBIN A1C:    CHOLESTEROL:        RADIOLOGY & ADDITIONAL TESTS:

## 2017-09-20 NOTE — DISCHARGE NOTE ADULT - CARE PROVIDER_API CALL
Duglas Hargrove (DO), Internal Medicine  [address] [place] [address]  Phone: [phone]  Fax: [phone]    Mina Rivera (DPM), Surgery  [address] [place] [address]  Phone: [phone]  Fax: [phone]

## 2017-09-20 NOTE — PROGRESS NOTE ADULT - SUBJECTIVE AND OBJECTIVE BOX
Patient is a 57y old  Male who presents with a chief complaint of left foot pain (18 Sep 2017 12:10)  S/p left flat foot reconstruction, POD #2       INTERVAL HPI/OVERNIGHT EVENTS:  Patient seen and evaluated at bedside.  Pt is resting comfortable in NAD. Denies N/V/F/C.  Pain rated at 7/10    Allergies    No Known Allergies    Intolerances        Vital Signs Last 24 Hrs  T(C): 36 (20 Sep 2017 12:44), Max: 37.7 (19 Sep 2017 21:10)  T(F): 96.8 (20 Sep 2017 12:44), Max: 99.8 (19 Sep 2017 21:10)  HR: 76 (20 Sep 2017 12:44) (69 - 83)  BP: 104/60 (20 Sep 2017 12:44) (104/60 - 129/72)  BP(mean): --  RR: 16 (20 Sep 2017 12:44) (16 - 17)  SpO2: 99% (20 Sep 2017 12:44) (93% - 100%)    LABS:              CAPILLARY BLOOD GLUCOSE          Lower Extremity Physical Exam:  LLE dressing and splint intact, no strikethrough, CFT to toes < 2 sec, slight numbness and tingling pain to toes, pain posteromedial ankle persists, no pain on ROM of toes.    RADIOLOGY & ADDITIONAL TESTS:

## 2017-09-21 VITALS
HEART RATE: 88 BPM | RESPIRATION RATE: 16 BRPM | DIASTOLIC BLOOD PRESSURE: 62 MMHG | OXYGEN SATURATION: 96 % | SYSTOLIC BLOOD PRESSURE: 108 MMHG

## 2017-09-21 LAB
ANION GAP SERPL CALC-SCNC: 8 MMOL/L — SIGNIFICANT CHANGE UP (ref 5–17)
BUN SERPL-MCNC: 8 MG/DL — SIGNIFICANT CHANGE UP (ref 7–23)
CALCIUM SERPL-MCNC: 8.2 MG/DL — LOW (ref 8.5–10.1)
CHLORIDE SERPL-SCNC: 104 MMOL/L — SIGNIFICANT CHANGE UP (ref 96–108)
CO2 SERPL-SCNC: 28 MMOL/L — SIGNIFICANT CHANGE UP (ref 22–31)
CREAT SERPL-MCNC: 0.77 MG/DL — SIGNIFICANT CHANGE UP (ref 0.5–1.3)
GLUCOSE SERPL-MCNC: 91 MG/DL — SIGNIFICANT CHANGE UP (ref 70–99)
HCT VFR BLD CALC: 42.1 % — SIGNIFICANT CHANGE UP (ref 39–50)
HGB BLD-MCNC: 14.1 G/DL — SIGNIFICANT CHANGE UP (ref 13–17)
MCHC RBC-ENTMCNC: 30.5 PG — SIGNIFICANT CHANGE UP (ref 27–34)
MCHC RBC-ENTMCNC: 33.5 GM/DL — SIGNIFICANT CHANGE UP (ref 32–36)
MCV RBC AUTO: 90.8 FL — SIGNIFICANT CHANGE UP (ref 80–100)
PLATELET # BLD AUTO: 164 K/UL — SIGNIFICANT CHANGE UP (ref 150–400)
POTASSIUM SERPL-MCNC: 3.7 MMOL/L — SIGNIFICANT CHANGE UP (ref 3.5–5.3)
POTASSIUM SERPL-SCNC: 3.7 MMOL/L — SIGNIFICANT CHANGE UP (ref 3.5–5.3)
RBC # BLD: 4.64 M/UL — SIGNIFICANT CHANGE UP (ref 4.2–5.8)
RBC # FLD: 12.1 % — SIGNIFICANT CHANGE UP (ref 11–15)
SODIUM SERPL-SCNC: 140 MMOL/L — SIGNIFICANT CHANGE UP (ref 135–145)
WBC # BLD: 7.8 K/UL — SIGNIFICANT CHANGE UP (ref 3.8–10.5)
WBC # FLD AUTO: 7.8 K/UL — SIGNIFICANT CHANGE UP (ref 3.8–10.5)

## 2017-09-21 RX ORDER — ENOXAPARIN SODIUM 100 MG/ML
40 INJECTION SUBCUTANEOUS
Qty: 10 | Refills: 0 | OUTPATIENT
Start: 2017-09-21 | End: 2017-10-01

## 2017-09-21 RX ORDER — ASPIRIN/CALCIUM CARB/MAGNESIUM 324 MG
2 TABLET ORAL
Qty: 0 | Refills: 0 | COMMUNITY

## 2017-09-21 RX ADMIN — Medication 100 MILLIGRAM(S): at 05:00

## 2017-09-21 RX ADMIN — Medication 1 TABLET(S): at 11:50

## 2017-09-21 RX ADMIN — AMLODIPINE BESYLATE 5 MILLIGRAM(S): 2.5 TABLET ORAL at 07:20

## 2017-09-21 RX ADMIN — Medication 500 MILLIGRAM(S): at 09:45

## 2017-09-21 RX ADMIN — Medication 500 MILLIGRAM(S): at 08:36

## 2017-09-21 RX ADMIN — ENOXAPARIN SODIUM 40 MILLIGRAM(S): 100 INJECTION SUBCUTANEOUS at 11:50

## 2017-09-21 RX ADMIN — Medication 100 MILLIGRAM(S): at 13:57

## 2017-09-21 NOTE — PROGRESS NOTE ADULT - ASSESSMENT
57M left foot flatfoot recon  - Pt seen and examined  - Pain controlled, much improvement, surgical sites stable  - Splint removed and BK cast reapplied  - L groin muscle spasm likely due to immobility and change in gait pattern post-operatively, continue manual massage  - Pod stable for d/c today  - NWB to LLE with crutches or wheelchair  - Duricef 500mg BID for 5 days  - Percocet 5/325 as needed for pain 1 week  - Lovenox 40mg daily for 14 days  - Follow-up with Dr. Rivera within 1 week of discharge.  - D/w attending

## 2017-09-21 NOTE — PROGRESS NOTE ADULT - SUBJECTIVE AND OBJECTIVE BOX
Patient is a 57y old  Male who presents with a chief complaint of left foot pain (20 Sep 2017 13:26)       INTERVAL HPI/OVERNIGHT EVENTS:  Patient seen and evaluated at bedside.  Pt is resting comfortable in NAD. Denies N/V/F/C.  Pain rated at X/10    Allergies    No Known Allergies    Intolerances        Vital Signs Last 24 Hrs  T(C): 36.8 (21 Sep 2017 11:21), Max: 36.8 (21 Sep 2017 11:21)  T(F): 98.2 (21 Sep 2017 11:21), Max: 98.2 (21 Sep 2017 11:21)  HR: 76 (21 Sep 2017 11:21) (63 - 76)  BP: 102/65 (21 Sep 2017 11:21) (102/65 - 123/70)  BP(mean): --  RR: 17 (21 Sep 2017 11:21) (16 - 17)  SpO2: 98% (21 Sep 2017 11:21) (93% - 99%)    LABS:                        14.1   7.8   )-----------( 164      ( 21 Sep 2017 06:42 )             42.1     09-21    140  |  104  |  8   ----------------------------<  91  3.7   |  28  |  0.77    Ca    8.2<L>      21 Sep 2017 06:42          CAPILLARY BLOOD GLUCOSE          Lower Extremity Physical Exam:  No strikethrough, CFT to toes < 2 sec, slight numbness and tingling pain to toes, pain posteromedial ankle persists, no pain on ROM of toes, surgical sites sutures well coapted, no dehiscence, no signs of ischemic necrosis, DP/PT pulses 2/4.    RADIOLOGY & ADDITIONAL TESTS:

## 2017-09-26 ENCOUNTER — TRANSCRIPTION ENCOUNTER (OUTPATIENT)
Age: 57
End: 2017-09-26

## 2017-09-27 ENCOUNTER — TRANSCRIPTION ENCOUNTER (OUTPATIENT)
Age: 57
End: 2017-09-27
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
## 2017-09-28 DIAGNOSIS — M62.838 OTHER MUSCLE SPASM: ICD-10-CM

## 2017-09-28 DIAGNOSIS — E78.5 HYPERLIPIDEMIA, UNSPECIFIED: ICD-10-CM

## 2017-09-28 DIAGNOSIS — S99.922S UNSPECIFIED INJURY OF LEFT FOOT, SEQUELA: ICD-10-CM

## 2017-09-28 DIAGNOSIS — S86.112A STRAIN OF OTHER MUSCLE(S) AND TENDON(S) OF POSTERIOR MUSCLE GROUP AT LOWER LEG LEVEL, LEFT LEG, INITIAL ENCOUNTER: ICD-10-CM

## 2017-09-28 DIAGNOSIS — Z79.82 LONG TERM (CURRENT) USE OF ASPIRIN: ICD-10-CM

## 2017-09-28 DIAGNOSIS — M21.42 FLAT FOOT [PES PLANUS] (ACQUIRED), LEFT FOOT: ICD-10-CM
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
## 2017-09-28 DIAGNOSIS — H40.1130 PRIMARY OPEN-ANGLE GLAUCOMA, BILATERAL, STAGE UNSPECIFIED: ICD-10-CM

## 2017-09-28 DIAGNOSIS — I10 ESSENTIAL (PRIMARY) HYPERTENSION: ICD-10-CM

## 2017-09-28 DIAGNOSIS — N40.0 BENIGN PROSTATIC HYPERPLASIA WITHOUT LOWER URINARY TRACT SYMPTOMS: ICD-10-CM

## 2017-10-09 ENCOUNTER — RX RENEWAL (OUTPATIENT)
Age: 57
End: 2017-10-09

## 2017-11-20 ENCOUNTER — MEDICATION RENEWAL (OUTPATIENT)
Age: 57
End: 2017-11-20

## 2017-11-20 ENCOUNTER — TRANSCRIPTION ENCOUNTER (OUTPATIENT)
Age: 57
End: 2017-11-20

## 2017-12-05 ENCOUNTER — APPOINTMENT (OUTPATIENT)
Dept: OPHTHALMOLOGY | Facility: CLINIC | Age: 57
End: 2017-12-05
Payer: COMMERCIAL

## 2017-12-05 PROCEDURE — 92250 FUNDUS PHOTOGRAPHY W/I&R: CPT

## 2017-12-05 PROCEDURE — 92014 COMPRE OPH EXAM EST PT 1/>: CPT

## 2017-12-13 ENCOUNTER — APPOINTMENT (OUTPATIENT)
Dept: MRI IMAGING | Facility: HOSPITAL | Age: 57
End: 2017-12-13
Payer: COMMERCIAL

## 2017-12-13 ENCOUNTER — OUTPATIENT (OUTPATIENT)
Dept: OUTPATIENT SERVICES | Facility: HOSPITAL | Age: 57
LOS: 1 days | End: 2017-12-13
Payer: COMMERCIAL

## 2017-12-13 DIAGNOSIS — S86.012A STRAIN OF LEFT ACHILLES TENDON, INITIAL ENCOUNTER: ICD-10-CM

## 2017-12-13 DIAGNOSIS — Z98.890 OTHER SPECIFIED POSTPROCEDURAL STATES: Chronic | ICD-10-CM

## 2017-12-13 PROCEDURE — 73721 MRI JNT OF LWR EXTRE W/O DYE: CPT | Mod: 26,LT

## 2017-12-13 PROCEDURE — 73721 MRI JNT OF LWR EXTRE W/O DYE: CPT

## 2017-12-26 ENCOUNTER — OUTPATIENT (OUTPATIENT)
Dept: OUTPATIENT SERVICES | Facility: HOSPITAL | Age: 57
LOS: 1 days | End: 2017-12-26
Payer: COMMERCIAL

## 2017-12-26 ENCOUNTER — APPOINTMENT (OUTPATIENT)
Dept: CARDIOLOGY | Facility: CLINIC | Age: 57
End: 2017-12-26
Payer: COMMERCIAL

## 2017-12-26 ENCOUNTER — APPOINTMENT (OUTPATIENT)
Dept: INTERNAL MEDICINE | Facility: CLINIC | Age: 57
End: 2017-12-26
Payer: COMMERCIAL

## 2017-12-26 VITALS
SYSTOLIC BLOOD PRESSURE: 120 MMHG | RESPIRATION RATE: 16 BRPM | DIASTOLIC BLOOD PRESSURE: 80 MMHG | OXYGEN SATURATION: 98 % | HEART RATE: 61 BPM | BODY MASS INDEX: 28.35 KG/M2 | WEIGHT: 198 LBS | HEIGHT: 70 IN

## 2017-12-26 VITALS
RESPIRATION RATE: 18 BRPM | SYSTOLIC BLOOD PRESSURE: 120 MMHG | HEIGHT: 70 IN | DIASTOLIC BLOOD PRESSURE: 80 MMHG | OXYGEN SATURATION: 98 % | HEART RATE: 74 BPM | TEMPERATURE: 97.7 F

## 2017-12-26 DIAGNOSIS — Z98.890 OTHER SPECIFIED POSTPROCEDURAL STATES: Chronic | ICD-10-CM

## 2017-12-26 DIAGNOSIS — Z78.9 OTHER SPECIFIED HEALTH STATUS: ICD-10-CM

## 2017-12-26 DIAGNOSIS — Z00.8 ENCOUNTER FOR OTHER GENERAL EXAMINATION: ICD-10-CM

## 2017-12-26 PROCEDURE — 93010 ELECTROCARDIOGRAM REPORT: CPT

## 2017-12-26 PROCEDURE — 93005 ELECTROCARDIOGRAM TRACING: CPT

## 2017-12-26 PROCEDURE — ZZZZZ: CPT

## 2017-12-26 PROCEDURE — G0463: CPT | Mod: 25

## 2017-12-26 PROCEDURE — 99214 OFFICE O/P EST MOD 30 MIN: CPT

## 2017-12-27 LAB
ALBUMIN SERPL ELPH-MCNC: 4.3 G/DL
ALP BLD-CCNC: 80 U/L
ALT SERPL-CCNC: 36 U/L
ANION GAP SERPL CALC-SCNC: 13 MMOL/L
AST SERPL-CCNC: 26 U/L
BASOPHILS # BLD AUTO: 0.03 K/UL
BASOPHILS NFR BLD AUTO: 0.6 %
BILIRUB SERPL-MCNC: 0.5 MG/DL
BUN SERPL-MCNC: 6 MG/DL
CALCIUM SERPL-MCNC: 9.3 MG/DL
CHLORIDE SERPL-SCNC: 101 MMOL/L
CHOLEST SERPL-MCNC: 180 MG/DL
CHOLEST/HDLC SERPL: 4.2 RATIO
CO2 SERPL-SCNC: 29 MMOL/L
CREAT SERPL-MCNC: 0.81 MG/DL
EOSINOPHIL # BLD AUTO: 0.2 K/UL
EOSINOPHIL NFR BLD AUTO: 3.7
GGT SERPL-CCNC: 14 U/L
GLUCOSE SERPL-MCNC: 95 MG/DL
HBA1C MFR BLD HPLC: 5.7 %
HCT VFR BLD CALC: 43 %
HDLC SERPL-MCNC: 43 MG/DL
HGB BLD-MCNC: 14 G/DL
IMM GRANULOCYTES NFR BLD AUTO: 0.2 %
LDLC SERPL CALC-MCNC: 105 MG/DL
LYMPHOCYTES # BLD AUTO: 2.56 K/UL
LYMPHOCYTES NFR BLD AUTO: 47.1 %
MAN DIFF?: NORMAL
MCHC RBC-ENTMCNC: 29.7 PG
MCHC RBC-ENTMCNC: 32.6 GM/DL
MCV RBC AUTO: 91.3 FL
MONOCYTES # BLD AUTO: 0.33 K/UL
MONOCYTES NFR BLD AUTO: 6.1 %
NEUTROPHILS # BLD AUTO: 2.3 K/UL
NEUTROPHILS NFR BLD AUTO: 42.3 %
PLATELET # BLD AUTO: 199 K/UL
POTASSIUM SERPL-SCNC: 4.2 MMOL/L
PROT SERPL-MCNC: 7 G/DL
RBC # BLD: 4.71 M/UL
RBC # FLD: 13.6 %
SODIUM SERPL-SCNC: 143 MMOL/L
TRIGL SERPL-MCNC: 158 MG/DL
WBC # FLD AUTO: 5.43 K/UL

## 2018-01-09 NOTE — ASU PATIENT PROFILE, ADULT - PATIENT REPRESENTATIVE PHONE
CERTIFICATE OF WORK    January 9, 2018      Re: Yoselin Fuentes  [address] [facility] [id number]      This is to certify that Yoselin Fuentes has been under my care on 1/9/2018 and is excused from work on 1/15/2018 to 1/31/2018. She may return to work 2/1/2018.            SIGNATURE:___________________________________________,   1/9/2018      Dr. Jamia Sandhu M.D.  [address] [id number] [phone]              
[phone]

## 2018-01-10 DIAGNOSIS — I10 ESSENTIAL (PRIMARY) HYPERTENSION: ICD-10-CM

## 2018-01-10 DIAGNOSIS — R06.09 OTHER FORMS OF DYSPNEA: ICD-10-CM

## 2018-01-24 ENCOUNTER — APPOINTMENT (OUTPATIENT)
Dept: ORTHOPEDIC SURGERY | Facility: CLINIC | Age: 58
End: 2018-01-24
Payer: COMMERCIAL

## 2018-01-24 DIAGNOSIS — M21.42 FLAT FOOT [PES PLANUS] (ACQUIRED), LEFT FOOT: ICD-10-CM

## 2018-01-24 DIAGNOSIS — M21.6X2 OTHER ACQUIRED DEFORMITIES OF LEFT FOOT: ICD-10-CM

## 2018-01-24 PROCEDURE — 99214 OFFICE O/P EST MOD 30 MIN: CPT

## 2018-01-24 PROCEDURE — 73630 X-RAY EXAM OF FOOT: CPT | Mod: LT

## 2018-02-03 LAB — HEMOCCULT STL QL IA: NEGATIVE

## 2018-02-05 PROBLEM — M21.42 PES PLANUS OF LEFT FOOT: Status: ACTIVE | Noted: 2017-05-30

## 2018-02-05 PROBLEM — M21.6X2 GASTROCNEMIUS EQUINUS OF LEFT LOWER EXTREMITY: Status: ACTIVE | Noted: 2017-05-30

## 2018-03-26 ENCOUNTER — APPOINTMENT (OUTPATIENT)
Dept: ORTHOPEDIC SURGERY | Facility: CLINIC | Age: 58
End: 2018-03-26
Payer: COMMERCIAL

## 2018-03-26 VITALS
HEART RATE: 67 BPM | DIASTOLIC BLOOD PRESSURE: 75 MMHG | BODY MASS INDEX: 28.06 KG/M2 | WEIGHT: 196 LBS | HEIGHT: 70 IN | SYSTOLIC BLOOD PRESSURE: 116 MMHG

## 2018-03-26 PROCEDURE — 99214 OFFICE O/P EST MOD 30 MIN: CPT

## 2018-03-26 PROCEDURE — 72100 X-RAY EXAM L-S SPINE 2/3 VWS: CPT

## 2018-05-23 ENCOUNTER — APPOINTMENT (OUTPATIENT)
Dept: OPHTHALMOLOGY | Facility: CLINIC | Age: 58
End: 2018-05-23
Payer: COMMERCIAL

## 2018-05-23 PROCEDURE — 92083 EXTENDED VISUAL FIELD XM: CPT

## 2018-05-23 PROCEDURE — 92133 CPTRZD OPH DX IMG PST SGM ON: CPT

## 2018-05-23 PROCEDURE — 92012 INTRM OPH EXAM EST PATIENT: CPT

## 2018-07-23 PROBLEM — E78.5 HYPERLIPIDEMIA, UNSPECIFIED: Chronic | Status: ACTIVE | Noted: 2017-09-08

## 2018-07-23 PROBLEM — H40.1130 PRIMARY OPEN-ANGLE GLAUCOMA, BILATERAL, STAGE UNSPECIFIED: Chronic | Status: ACTIVE | Noted: 2017-09-08

## 2018-07-23 PROBLEM — N40.0 BENIGN PROSTATIC HYPERPLASIA WITHOUT LOWER URINARY TRACT SYMPTOMS: Chronic | Status: ACTIVE | Noted: 2017-09-08

## 2018-07-23 PROBLEM — I10 ESSENTIAL (PRIMARY) HYPERTENSION: Chronic | Status: ACTIVE | Noted: 2017-09-08

## 2018-07-25 ENCOUNTER — APPOINTMENT (OUTPATIENT)
Dept: ULTRASOUND IMAGING | Facility: CLINIC | Age: 58
End: 2018-07-25
Payer: COMMERCIAL

## 2018-07-25 ENCOUNTER — OUTPATIENT (OUTPATIENT)
Dept: OUTPATIENT SERVICES | Facility: HOSPITAL | Age: 58
LOS: 1 days | End: 2018-07-25
Payer: COMMERCIAL

## 2018-07-25 DIAGNOSIS — Z00.8 ENCOUNTER FOR OTHER GENERAL EXAMINATION: ICD-10-CM

## 2018-07-25 DIAGNOSIS — Z98.890 OTHER SPECIFIED POSTPROCEDURAL STATES: Chronic | ICD-10-CM

## 2018-07-25 PROBLEM — Z78.9 ALCOHOL USE: Status: INACTIVE | Noted: 2017-12-26

## 2018-07-25 PROCEDURE — 76882 US LMTD JT/FCL EVL NVASC XTR: CPT | Mod: 26,LT

## 2018-07-25 PROCEDURE — 76882 US LMTD JT/FCL EVL NVASC XTR: CPT

## 2018-07-26 ENCOUNTER — OUTPATIENT (OUTPATIENT)
Dept: OUTPATIENT SERVICES | Facility: HOSPITAL | Age: 58
LOS: 1 days | Discharge: HOME | End: 2018-07-26
Payer: COMMERCIAL

## 2018-07-26 VITALS
HEART RATE: 70 BPM | OXYGEN SATURATION: 97 % | DIASTOLIC BLOOD PRESSURE: 73 MMHG | HEIGHT: 70 IN | RESPIRATION RATE: 18 BRPM | TEMPERATURE: 98 F | SYSTOLIC BLOOD PRESSURE: 113 MMHG | WEIGHT: 202.16 LBS

## 2018-07-26 DIAGNOSIS — T84.84XA PAIN DUE TO INTERNAL ORTHOPEDIC PROSTHETIC DEVICES, IMPLANTS AND GRAFTS, INITIAL ENCOUNTER: ICD-10-CM

## 2018-07-26 DIAGNOSIS — N40.0 BENIGN PROSTATIC HYPERPLASIA WITHOUT LOWER URINARY TRACT SYMPTOMS: ICD-10-CM

## 2018-07-26 DIAGNOSIS — E78.5 HYPERLIPIDEMIA, UNSPECIFIED: ICD-10-CM

## 2018-07-26 DIAGNOSIS — Z98.890 OTHER SPECIFIED POSTPROCEDURAL STATES: Chronic | ICD-10-CM

## 2018-07-26 DIAGNOSIS — H40.1130 PRIMARY OPEN-ANGLE GLAUCOMA, BILATERAL, STAGE UNSPECIFIED: ICD-10-CM

## 2018-07-26 DIAGNOSIS — Z01.818 ENCOUNTER FOR OTHER PREPROCEDURAL EXAMINATION: ICD-10-CM

## 2018-07-26 DIAGNOSIS — I10 ESSENTIAL (PRIMARY) HYPERTENSION: ICD-10-CM

## 2018-07-26 LAB
ANION GAP SERPL CALC-SCNC: 3 MMOL/L — LOW (ref 5–17)
BASOPHILS # BLD AUTO: 0.05 K/UL — SIGNIFICANT CHANGE UP (ref 0–0.2)
BASOPHILS NFR BLD AUTO: 0.9 % — SIGNIFICANT CHANGE UP (ref 0–2)
BUN SERPL-MCNC: 8 MG/DL — SIGNIFICANT CHANGE UP (ref 7–23)
CALCIUM SERPL-MCNC: 9 MG/DL — SIGNIFICANT CHANGE UP (ref 8.5–10.1)
CHLORIDE SERPL-SCNC: 106 MMOL/L — SIGNIFICANT CHANGE UP (ref 96–108)
CO2 SERPL-SCNC: 31 MMOL/L — SIGNIFICANT CHANGE UP (ref 22–31)
CREAT SERPL-MCNC: 0.91 MG/DL — SIGNIFICANT CHANGE UP (ref 0.5–1.3)
EOSINOPHIL # BLD AUTO: 0.13 K/UL — SIGNIFICANT CHANGE UP (ref 0–0.5)
EOSINOPHIL NFR BLD AUTO: 2.3 % — SIGNIFICANT CHANGE UP (ref 0–6)
GLUCOSE SERPL-MCNC: 94 MG/DL — SIGNIFICANT CHANGE UP (ref 70–99)
HCT VFR BLD CALC: 45.1 % — SIGNIFICANT CHANGE UP (ref 39–50)
HGB BLD-MCNC: 14.9 G/DL — SIGNIFICANT CHANGE UP (ref 13–17)
IMM GRANULOCYTES NFR BLD AUTO: 0.2 % — SIGNIFICANT CHANGE UP (ref 0–1.5)
LYMPHOCYTES # BLD AUTO: 2.59 K/UL — SIGNIFICANT CHANGE UP (ref 1–3.3)
LYMPHOCYTES # BLD AUTO: 46 % — HIGH (ref 13–44)
MCHC RBC-ENTMCNC: 29.7 PG — SIGNIFICANT CHANGE UP (ref 27–34)
MCHC RBC-ENTMCNC: 33 GM/DL — SIGNIFICANT CHANGE UP (ref 32–36)
MCV RBC AUTO: 90 FL — SIGNIFICANT CHANGE UP (ref 80–100)
MONOCYTES # BLD AUTO: 0.43 K/UL — SIGNIFICANT CHANGE UP (ref 0–0.9)
MONOCYTES NFR BLD AUTO: 7.6 % — SIGNIFICANT CHANGE UP (ref 2–14)
NEUTROPHILS # BLD AUTO: 2.42 K/UL — SIGNIFICANT CHANGE UP (ref 1.8–7.4)
NEUTROPHILS NFR BLD AUTO: 43 % — SIGNIFICANT CHANGE UP (ref 43–77)
NRBC # BLD: 0 /100 WBCS — SIGNIFICANT CHANGE UP (ref 0–0)
PLATELET # BLD AUTO: 202 K/UL — SIGNIFICANT CHANGE UP (ref 150–400)
POTASSIUM SERPL-MCNC: 4.4 MMOL/L — SIGNIFICANT CHANGE UP (ref 3.5–5.3)
POTASSIUM SERPL-SCNC: 4.4 MMOL/L — SIGNIFICANT CHANGE UP (ref 3.5–5.3)
RBC # BLD: 5.01 M/UL — SIGNIFICANT CHANGE UP (ref 4.2–5.8)
RBC # FLD: 13.2 % — SIGNIFICANT CHANGE UP (ref 10.3–14.5)
SODIUM SERPL-SCNC: 140 MMOL/L — SIGNIFICANT CHANGE UP (ref 135–145)
WBC # BLD: 5.63 K/UL — SIGNIFICANT CHANGE UP (ref 3.8–10.5)
WBC # FLD AUTO: 5.63 K/UL — SIGNIFICANT CHANGE UP (ref 3.8–10.5)

## 2018-07-26 PROCEDURE — 93010 ELECTROCARDIOGRAM REPORT: CPT | Mod: NC

## 2018-07-26 NOTE — H&P PST ADULT - ASSESSMENT
58M pmh htn, pre-glaucoma, bph, hl here for PST for scheduled Removal of hardware left heel 58M pmh htn, pre-glaucoma, bph, hl here for PST for scheduled Removal of hardware left heel  CAPRINI SCORE    AGE RELATED RISK FACTORS                                                       MOBILITY RELATED FACTORS  [x ] Age 41-60 years                                            (1 Point)                  [ ] Bed rest                                                        (1 Point)  [ ] Age: 61-74 years                                           (2 Points)                [ ] Plaster cast                                                   (2 Points)  [ ] Age= 75 years                                              (3 Points)                 [ ] Bed bound for more than 72 hours                   (2 Points)    DISEASE RELATED RISK FACTORS                                               GENDER SPECIFIC FACTORS  [ ] Edema in the lower extremities                       (1 Point)                  [ ] Pregnancy                                                     (1 Point)  [ ] Varicose veins                                               (1 Point)                  [ ] Post-partum < 6 weeks                                   (1 Point)             [x ] BMI > 25 Kg/m2                                            (1 Point)                  [ ] Hormonal therapy  or oral contraception            (1 Point)                 [ ] Sepsis (in the previous month)                        (1 Point)                  [ ] History of pregnancy complications  [ ] Pneumonia or serious lung disease                                               [ ] Unexplained or recurrent                       (1 Point)           (in the previous month)                               (1 Point)  [ ] Abnormal pulmonary function test                     (1 Point)                 SURGERY RELATED RISK FACTORS  [ ] Acute myocardial infarction                              (1 Point)                 [ ]  Section                                            (1 Point)  [ ] Congestive heart failure (in the previous month)  (1 Point)                 [ ] Minor surgery                                                 (1 Point)   [ ] Inflammatory bowel disease                             (1 Point)                 [ ] Arthroscopic surgery                                        (2 Points)  [ ] Central venous access                                    (2 Points)                [x ] General surgery lasting more than 45 minutes   (2 Points)       [ ] Stroke (in the previous month)                          (5 Points)               [ ] Elective arthroplasty                                        (5 Points)                                                                                                                                               HEMATOLOGY RELATED FACTORS                                                 TRAUMA RELATED RISK FACTORS  [ ] Prior episodes of VTE                                     (3 Points)                 [ ] Fracture of the hip, pelvis, or leg                       (5 Points)  [ ] Positive family history for VTE                         (3 Points)                 [ ] Acute spinal cord injury (in the previous month)  (5 Points)  [ ] Prothrombin 95196 A                                      (3 Points)                 [ ] Paralysis  (less than 1 month)                          (5 Points)  [ ] Factor V Leiden                                             (3 Points)                 [ ] Multiple Trauma within 1 month                         (5 Points)  [ ] Lupus anticoagulants                                     (3 Points)                                                           [ ] Anticardiolipin antibodies                                (3 Points)                                                       [ ] High homocysteine in the blood                      (3 Points)                                             [ ] Other congenital or acquired thrombophilia       (3 Points)                                                [ ] Heparin induced thrombocytopenia                  (3 Points)                                          Total Score [      4    ]

## 2018-07-29 ENCOUNTER — TRANSCRIPTION ENCOUNTER (OUTPATIENT)
Age: 58
End: 2018-07-29

## 2018-07-30 ENCOUNTER — OUTPATIENT (OUTPATIENT)
Dept: OUTPATIENT SERVICES | Facility: HOSPITAL | Age: 58
LOS: 1 days | Discharge: HOME | End: 2018-07-30

## 2018-07-30 ENCOUNTER — RESULT REVIEW (OUTPATIENT)
Age: 58
End: 2018-07-30

## 2018-07-30 VITALS
HEART RATE: 62 BPM | RESPIRATION RATE: 18 BRPM | SYSTOLIC BLOOD PRESSURE: 102 MMHG | DIASTOLIC BLOOD PRESSURE: 63 MMHG | TEMPERATURE: 98 F | OXYGEN SATURATION: 99 %

## 2018-07-30 VITALS
WEIGHT: 197.09 LBS | HEART RATE: 61 BPM | OXYGEN SATURATION: 96 % | HEIGHT: 70 IN | SYSTOLIC BLOOD PRESSURE: 112 MMHG | RESPIRATION RATE: 16 BRPM | TEMPERATURE: 98 F | DIASTOLIC BLOOD PRESSURE: 68 MMHG

## 2018-07-30 DIAGNOSIS — Z98.890 OTHER SPECIFIED POSTPROCEDURAL STATES: Chronic | ICD-10-CM

## 2018-07-30 PROCEDURE — 88300 SURGICAL PATH GROSS: CPT | Mod: 26,59

## 2018-07-30 RX ORDER — AMLODIPINE BESYLATE 2.5 MG/1
1 TABLET ORAL
Qty: 0 | Refills: 0 | COMMUNITY

## 2018-07-30 RX ORDER — TAMSULOSIN HYDROCHLORIDE 0.4 MG/1
1 CAPSULE ORAL
Qty: 0 | Refills: 0 | COMMUNITY

## 2018-07-30 RX ORDER — HYDROMORPHONE HYDROCHLORIDE 2 MG/ML
0.5 INJECTION INTRAMUSCULAR; INTRAVENOUS; SUBCUTANEOUS
Qty: 0 | Refills: 0 | Status: DISCONTINUED | OUTPATIENT
Start: 2018-07-30 | End: 2018-07-30

## 2018-07-30 RX ORDER — ONDANSETRON 8 MG/1
4 TABLET, FILM COATED ORAL
Qty: 0 | Refills: 0 | Status: DISCONTINUED | OUTPATIENT
Start: 2018-07-30 | End: 2018-07-30

## 2018-07-30 RX ORDER — SODIUM CHLORIDE 9 MG/ML
1000 INJECTION, SOLUTION INTRAVENOUS
Qty: 0 | Refills: 0 | Status: DISCONTINUED | OUTPATIENT
Start: 2018-07-30 | End: 2018-07-30

## 2018-07-30 RX ORDER — HYDROMORPHONE HYDROCHLORIDE 2 MG/ML
1 INJECTION INTRAMUSCULAR; INTRAVENOUS; SUBCUTANEOUS
Qty: 0 | Refills: 0 | Status: DISCONTINUED | OUTPATIENT
Start: 2018-07-30 | End: 2018-07-30

## 2018-07-30 RX ORDER — ASCORBIC ACID 60 MG
1 TABLET,CHEWABLE ORAL
Qty: 0 | Refills: 0 | COMMUNITY

## 2018-07-30 RX ORDER — MEPERIDINE HYDROCHLORIDE 50 MG/ML
12.5 INJECTION INTRAMUSCULAR; INTRAVENOUS; SUBCUTANEOUS
Qty: 0 | Refills: 0 | Status: DISCONTINUED | OUTPATIENT
Start: 2018-07-30 | End: 2018-07-30

## 2018-07-30 RX ORDER — ATORVASTATIN CALCIUM 80 MG/1
1 TABLET, FILM COATED ORAL
Qty: 0 | Refills: 0 | COMMUNITY

## 2018-07-30 RX ORDER — PREGABALIN 225 MG/1
1 CAPSULE ORAL
Qty: 0 | Refills: 0 | COMMUNITY

## 2018-07-30 RX ORDER — LATANOPROST 0.05 MG/ML
1 SOLUTION/ DROPS OPHTHALMIC; TOPICAL
Qty: 0 | Refills: 0 | COMMUNITY

## 2018-07-30 RX ADMIN — SODIUM CHLORIDE 100 MILLILITER(S): 9 INJECTION, SOLUTION INTRAVENOUS at 15:27

## 2018-07-30 NOTE — ASU DISCHARGE PLAN (ADULT/PEDIATRIC). - NOTIFY
Numbness, color, or temperature change to extremity/Inability to Tolerate Liquids or Foods/Swelling that continues/Numbness, tingling/Bleeding that does not stop/Persistent Nausea and Vomiting/Pain not relieved by Medications

## 2018-07-30 NOTE — ASU DISCHARGE PLAN (ADULT/PEDIATRIC). - MEDICATION SUMMARY - MEDICATIONS TO TAKE
I will START or STAY ON the medications listed below when I get home from the hospital:    tamsulosin 0.4 mg oral capsule  -- 1 cap(s) by mouth once a day (at bedtime)  -- Indication: For As prescribed     Lipitor 10 mg oral tablet  -- 1 tab(s) by mouth once a day (at bedtime)  -- Indication: For As prescribed     amLODIPine 5 mg oral tablet  -- 1 tab(s) by mouth once a day  -- Indication: For As prescribed     latanoprost 0.005% ophthalmic solution  -- 1 drop(s) to each affected eye once a day (in the evening)  -- Indication: For As prescribed     multivitamin  -- 1 tab(s) by mouth once a day  -- Indication: For As prescribed     Vitamin B-12  -- 1 tab(s) by mouth once a day  -- Indication: For As prescribed     Vitamin C 1000 mg oral tablet  -- 1 tab(s) by mouth once a day  -- Indication: For As prescribed     Vitamin D3 5000 intl units oral capsule  -- 1 cap(s) by mouth once a day  -- Indication: For As prescribed

## 2018-08-01 DIAGNOSIS — N40.0 BENIGN PROSTATIC HYPERPLASIA WITHOUT LOWER URINARY TRACT SYMPTOMS: ICD-10-CM

## 2018-08-01 DIAGNOSIS — E78.5 HYPERLIPIDEMIA, UNSPECIFIED: ICD-10-CM

## 2018-08-01 DIAGNOSIS — Z47.2 ENCOUNTER FOR REMOVAL OF INTERNAL FIXATION DEVICE: ICD-10-CM

## 2018-08-01 DIAGNOSIS — I10 ESSENTIAL (PRIMARY) HYPERTENSION: ICD-10-CM

## 2018-08-01 DIAGNOSIS — H40.1130 PRIMARY OPEN-ANGLE GLAUCOMA, BILATERAL, STAGE UNSPECIFIED: ICD-10-CM

## 2018-08-01 LAB — SURGICAL PATHOLOGY FINAL REPORT - CH: SIGNIFICANT CHANGE UP

## 2018-08-21 ENCOUNTER — APPOINTMENT (OUTPATIENT)
Dept: CARDIOLOGY | Facility: CLINIC | Age: 58
End: 2018-08-21
Payer: COMMERCIAL

## 2018-08-21 ENCOUNTER — OUTPATIENT (OUTPATIENT)
Dept: OUTPATIENT SERVICES | Facility: HOSPITAL | Age: 58
LOS: 1 days | End: 2018-08-21
Payer: COMMERCIAL

## 2018-08-21 VITALS
HEART RATE: 74 BPM | SYSTOLIC BLOOD PRESSURE: 107 MMHG | BODY MASS INDEX: 28.35 KG/M2 | WEIGHT: 198 LBS | HEIGHT: 70 IN | RESPIRATION RATE: 16 BRPM | OXYGEN SATURATION: 97 % | DIASTOLIC BLOOD PRESSURE: 72 MMHG

## 2018-08-21 DIAGNOSIS — Z98.890 OTHER SPECIFIED POSTPROCEDURAL STATES: Chronic | ICD-10-CM

## 2018-08-21 DIAGNOSIS — Z00.8 ENCOUNTER FOR OTHER GENERAL EXAMINATION: ICD-10-CM

## 2018-08-21 PROCEDURE — ZZZZZ: CPT

## 2018-08-21 PROCEDURE — G0463: CPT | Mod: 25

## 2018-08-24 DIAGNOSIS — I10 ESSENTIAL (PRIMARY) HYPERTENSION: ICD-10-CM

## 2018-08-24 DIAGNOSIS — R07.9 CHEST PAIN, UNSPECIFIED: ICD-10-CM

## 2018-09-18 ENCOUNTER — APPOINTMENT (OUTPATIENT)
Dept: OPHTHALMOLOGY | Facility: CLINIC | Age: 58
End: 2018-09-18
Payer: COMMERCIAL

## 2018-09-18 PROCEDURE — 92012 INTRM OPH EXAM EST PATIENT: CPT

## 2018-09-18 PROCEDURE — 92083 EXTENDED VISUAL FIELD XM: CPT

## 2018-09-28 ENCOUNTER — APPOINTMENT (OUTPATIENT)
Dept: INTERNAL MEDICINE | Facility: CLINIC | Age: 58
End: 2018-09-28
Payer: COMMERCIAL

## 2018-09-28 ENCOUNTER — LABORATORY RESULT (OUTPATIENT)
Age: 58
End: 2018-09-28

## 2018-09-28 VITALS
OXYGEN SATURATION: 98 % | HEIGHT: 70 IN | WEIGHT: 197 LBS | HEART RATE: 69 BPM | DIASTOLIC BLOOD PRESSURE: 80 MMHG | RESPIRATION RATE: 16 BRPM | BODY MASS INDEX: 28.2 KG/M2 | SYSTOLIC BLOOD PRESSURE: 120 MMHG | TEMPERATURE: 97.6 F

## 2018-09-28 DIAGNOSIS — M79.2 NEURALGIA AND NEURITIS, UNSPECIFIED: ICD-10-CM

## 2018-09-28 PROCEDURE — 99396 PREV VISIT EST AGE 40-64: CPT

## 2018-09-28 NOTE — HISTORY OF PRESENT ILLNESS
[de-identified] : 58 year old male patient with history of stable Essential Hypertension, Hypercholesterolemia, Elevated Hemoglobin A1c, Vitamin D Deficiency, history as stated, presented for an annual preventative examination. Patient denies any associated symptoms of shortness of breath, chest pain, abdominal pain at this time.\par \par

## 2018-09-28 NOTE — ASSESSMENT
[FreeTextEntry1] : 58 year old male found to have stable  Essential Hypertension, Hypercholesterolemia, Elevated Hemoglobin A1c, Vitamin D Deficiency,with the current regimen, diet and life style modifications, as counseled. Prior results reviewed and discussed with the patient during today's examination. Plan as ordered.\par Patient was recently evaluated by CARD , findings and recommendations reviewed with the patient during today's examination.\par

## 2018-09-28 NOTE — HEALTH RISK ASSESSMENT
[Good] : ~his/her~  mood as  good [No falls in past year] : Patient reported no falls in the past year [0] : 2) Feeling down, depressed, or hopeless: Not at all (0) [HIV test declined] : HIV test declined [Hepatitis C test offered] : Hepatitis C test offered [None] : None [Feels Safe at Home] : Feels safe at home [Fully functional (bathing, dressing, toileting, transferring, walking, feeding)] : Fully functional (bathing, dressing, toileting, transferring, walking, feeding) [Fully functional (using the telephone, shopping, preparing meals, housekeeping, doing laundry, using] : Fully functional and needs no help or supervision to perform IADLs (using the telephone, shopping, preparing meals, housekeeping, doing laundry, using transportation, managing medications and managing finances) [Smoke Detector] : smoke detector [Carbon Monoxide Detector] : carbon monoxide detector [Seat Belt] :  uses seat belt [Sunscreen] : uses sunscreen [Discussed at today's visit] : Advance Directives Discussed at today's visit [FreeTextEntry1] : Check up\par  [] : No [de-identified] : CARD [[id number]] : 0 [Change in mental status noted] : No change in mental status noted [Reports changes in hearing] : Reports no changes in hearing [Reports changes in vision] : Reports no changes in vision [Reports changes in dental health] : Reports no changes in dental health

## 2018-10-01 LAB
25(OH)D3 SERPL-MCNC: 73 NG/ML
ALBUMIN SERPL ELPH-MCNC: 4.3 G/DL
ALP BLD-CCNC: 73 U/L
ALT SERPL-CCNC: 17 U/L
ANION GAP SERPL CALC-SCNC: 12 MMOL/L
APPEARANCE: CLEAR
AST SERPL-CCNC: 22 U/L
BASOPHILS # BLD AUTO: 0.04 K/UL
BASOPHILS NFR BLD AUTO: 0.7 %
BILIRUB SERPL-MCNC: 0.5 MG/DL
BILIRUBIN URINE: NEGATIVE
BLOOD URINE: NEGATIVE
BUN SERPL-MCNC: 6 MG/DL
CALCIUM SERPL-MCNC: 9.4 MG/DL
CHLORIDE SERPL-SCNC: 100 MMOL/L
CHOLEST SERPL-MCNC: 142 MG/DL
CHOLEST/HDLC SERPL: 3.4 RATIO
CO2 SERPL-SCNC: 27 MMOL/L
COLOR: YELLOW
CREAT SERPL-MCNC: 0.83 MG/DL
EOSINOPHIL # BLD AUTO: 0.12 K/UL
EOSINOPHIL NFR BLD AUTO: 2.1 %
ERYTHROCYTE [SEDIMENTATION RATE] IN BLOOD BY WESTERGREN METHOD: 2 MM/HR
FOLATE SERPL-MCNC: >20 NG/ML
GGT SERPL-CCNC: 14 U/L
GLUCOSE QUALITATIVE U: NEGATIVE MG/DL
GLUCOSE SERPL-MCNC: 91 MG/DL
HBA1C MFR BLD HPLC: 5.9 %
HCT VFR BLD CALC: 45.1 %
HCV AB SER QL: NONREACTIVE
HCV S/CO RATIO: 0.06 S/CO
HDLC SERPL-MCNC: 42 MG/DL
HGB BLD-MCNC: 14.7 G/DL
IMM GRANULOCYTES NFR BLD AUTO: 0 %
IRON SATN MFR SERPL: 25 %
IRON SERPL-MCNC: 88 UG/DL
KETONES URINE: NEGATIVE
LDLC SERPL CALC-MCNC: 79 MG/DL
LEUKOCYTE ESTERASE URINE: ABNORMAL
LYMPHOCYTES # BLD AUTO: 2.38 K/UL
LYMPHOCYTES NFR BLD AUTO: 41.4 %
MAN DIFF?: NORMAL
MCHC RBC-ENTMCNC: 29.9 PG
MCHC RBC-ENTMCNC: 32.6 GM/DL
MCV RBC AUTO: 91.7 FL
MONOCYTES # BLD AUTO: 0.41 K/UL
MONOCYTES NFR BLD AUTO: 7.1 %
NEUTROPHILS # BLD AUTO: 2.8 K/UL
NEUTROPHILS NFR BLD AUTO: 48.7 %
NITRITE URINE: NEGATIVE
PH URINE: 8
PLATELET # BLD AUTO: 218 K/UL
POTASSIUM SERPL-SCNC: 4.6 MMOL/L
PROT SERPL-MCNC: 7.2 G/DL
PROTEIN URINE: NEGATIVE MG/DL
PSA FREE FLD-MCNC: 12.6
PSA FREE SERPL-MCNC: 0.56 NG/ML
PSA SERPL-MCNC: 4.44 NG/ML
RBC # BLD: 4.92 M/UL
RBC # FLD: 13.6 %
SODIUM SERPL-SCNC: 139 MMOL/L
SPECIFIC GRAVITY URINE: 1.01
T3 SERPL-MCNC: 147 NG/DL
T4 FREE SERPL-MCNC: 1.3 NG/DL
TIBC SERPL-MCNC: 352 UG/DL
TRIGL SERPL-MCNC: 107 MG/DL
TSH SERPL-ACNC: 1.37 UIU/ML
UIBC SERPL-MCNC: 264 UG/DL
UROBILINOGEN URINE: NEGATIVE MG/DL
VIT B12 SERPL-MCNC: >2000 PG/ML
WBC # FLD AUTO: 5.75 K/UL

## 2018-10-12 ENCOUNTER — OUTPATIENT (OUTPATIENT)
Dept: OUTPATIENT SERVICES | Facility: HOSPITAL | Age: 58
LOS: 1 days | End: 2018-10-12
Payer: COMMERCIAL

## 2018-10-12 ENCOUNTER — APPOINTMENT (OUTPATIENT)
Dept: MRI IMAGING | Facility: HOSPITAL | Age: 58
End: 2018-10-12
Payer: COMMERCIAL

## 2018-10-12 DIAGNOSIS — Z98.890 OTHER SPECIFIED POSTPROCEDURAL STATES: Chronic | ICD-10-CM

## 2018-10-12 DIAGNOSIS — M79.672 PAIN IN LEFT FOOT: ICD-10-CM

## 2018-10-12 DIAGNOSIS — M24.672 ANKYLOSIS, LEFT ANKLE: ICD-10-CM

## 2018-10-12 PROCEDURE — 73721 MRI JNT OF LWR EXTRE W/O DYE: CPT | Mod: 26,LT

## 2018-10-12 PROCEDURE — 73721 MRI JNT OF LWR EXTRE W/O DYE: CPT

## 2018-10-24 LAB — HEMOCCULT STL QL IA: NEGATIVE

## 2018-10-25 ENCOUNTER — RX RENEWAL (OUTPATIENT)
Age: 58
End: 2018-10-25

## 2018-11-12 ENCOUNTER — RX RENEWAL (OUTPATIENT)
Age: 58
End: 2018-11-12

## 2018-12-05 ENCOUNTER — RX RENEWAL (OUTPATIENT)
Age: 58
End: 2018-12-05

## 2018-12-05 ENCOUNTER — TRANSCRIPTION ENCOUNTER (OUTPATIENT)
Age: 58
End: 2018-12-05

## 2019-03-10 ENCOUNTER — TRANSCRIPTION ENCOUNTER (OUTPATIENT)
Age: 59
End: 2019-03-10

## 2019-03-11 ENCOUNTER — TRANSCRIPTION ENCOUNTER (OUTPATIENT)
Age: 59
End: 2019-03-11

## 2019-04-02 ENCOUNTER — APPOINTMENT (OUTPATIENT)
Dept: OPHTHALMOLOGY | Facility: CLINIC | Age: 59
End: 2019-04-02
Payer: COMMERCIAL

## 2019-04-02 PROCEDURE — 92014 COMPRE OPH EXAM EST PT 1/>: CPT

## 2019-04-02 PROCEDURE — 92133 CPTRZD OPH DX IMG PST SGM ON: CPT

## 2019-04-24 ENCOUNTER — APPOINTMENT (OUTPATIENT)
Dept: ORTHOPEDIC SURGERY | Facility: CLINIC | Age: 59
End: 2019-04-24
Payer: COMMERCIAL

## 2019-04-24 VITALS
HEIGHT: 70 IN | WEIGHT: 197 LBS | HEART RATE: 69 BPM | DIASTOLIC BLOOD PRESSURE: 73 MMHG | BODY MASS INDEX: 28.2 KG/M2 | SYSTOLIC BLOOD PRESSURE: 109 MMHG

## 2019-04-24 DIAGNOSIS — M51.36 OTHER INTERVERTEBRAL DISC DEGENERATION, LUMBAR REGION: ICD-10-CM

## 2019-04-24 PROCEDURE — 99214 OFFICE O/P EST MOD 30 MIN: CPT

## 2019-04-24 PROCEDURE — 72100 X-RAY EXAM L-S SPINE 2/3 VWS: CPT

## 2019-04-24 NOTE — DISCUSSION/SUMMARY
[de-identified] : lumbar L5-S1 degenerative disc disease\par discussed options\par lumbar brochure\par PT \par and Voltaren\par if no better 3 weeks MRI lumbar\par All questions were answered, all alternatives discussed and the patient is in complete agreement with that plan. Follow-up appointment as instructed. Any issues and the patient will call or come in sooner.

## 2019-04-24 NOTE — HISTORY OF PRESENT ILLNESS
[Pain] : pain [All Other ROS Normal] : All other review of systems are negative except as noted [Improving] : improving [8] : a current pain level of 8/10 [Stable] : stable [de-identified] : Right sided lower back pain for 3 weeks \par arms and legs ok \par Patient points over his right SIJ \par takes Flexeril and naproxen for pain \par had xrays last year\par He feels most pain when sleeping and transitioning from sitting to standing \par No fever chills sweats nausea vomiting no bowel or bladder dysfunction, no recent weight loss or gain no night pain. This history is in addition to the intake form that I personally reviewed.  [Headache] : no headache [Dizziness] : no dizziness [Fainting] : no fainting [FreeTextEntry1] : cervical and lumbar pain for one week-getting better\par Flexeril and heat.\par  [FreeTextEntry2] : No radiating arm or leg pain.\par No fever chills sweats nausea vomiting no bowel,  no recent weight loss or gain no night pain. This history is in addition to the intake form that I personally reviewed.  \par No trauma.\par No fever chills sweats nausea vomiting no bowel or bladder dysfunction, no recent weight loss or gain no night pain. This history is in addition to the intake form that I personally reviewed.

## 2019-04-24 NOTE — PHYSICAL EXAM
[Normal] : normal [Full] : is full [UE/LE] : Sensory: Intact in bilateral upper & lower extremities [ALL] : dorsalis pedis, posterior tibial, femoral, popliteal, and radial 2+ and symmetric bilaterally [de-identified] : 5 out of 5 motor strength, sensation is intact and symmetrical full range of motion flexion extension and rotation, no palpatory tenderness full range of motion of hips knees shoulders and elbows (all four extremities), no atrophy, negative straight leg raise, no pathological reflexes, no swelling, normal ambulation, no apparent distress skin is intact, no palpable lymph nodes, no upper or lower extremity instability, alert and oriented x3 and normal mood. Normal finger-to nose test. \par Pain over right SIJ [Poor Appearance] : well-appearing [Acute Distress] : not in acute distress [Obese] : not obese [Poor Coordination] : normal coordination [Abl Mood] : in a normal mood [Abl Affect] : with normal affect [Disorientation] : oriented x 3 [SLR] : negative straight leg raise [FreeTextEntry2] : neuro intact\par 5 out of 5 motor strength, sensation is intact and symmetrical full range of motion flexion extension and rotation, no palpatory tenderness full range of motion of hips knees shoulders and elbows (all four extremities), no atrophy, negative straight leg raise, no pathological reflexes, no swelling, normal ambulation, no apparent distress skin is intact, no palpable lymph nodes, no upper or lower extremity instability, alert and oriented x3 and normal mood. Normal finger-to nose test.  [de-identified] : AP/lat lumbar-L5-S1 degeneration-reviewed with the patient. No change from last year.

## 2019-05-06 ENCOUNTER — TRANSCRIPTION ENCOUNTER (OUTPATIENT)
Age: 59
End: 2019-05-06

## 2019-05-07 ENCOUNTER — OTHER (OUTPATIENT)
Age: 59
End: 2019-05-07

## 2019-05-09 ENCOUNTER — OTHER (OUTPATIENT)
Age: 59
End: 2019-05-09

## 2019-05-20 ENCOUNTER — TRANSCRIPTION ENCOUNTER (OUTPATIENT)
Age: 59
End: 2019-05-20

## 2019-05-24 ENCOUNTER — OTHER (OUTPATIENT)
Age: 59
End: 2019-05-24

## 2019-05-31 ENCOUNTER — TRANSCRIPTION ENCOUNTER (OUTPATIENT)
Age: 59
End: 2019-05-31

## 2019-06-04 ENCOUNTER — APPOINTMENT (OUTPATIENT)
Dept: INTERNAL MEDICINE | Facility: CLINIC | Age: 59
End: 2019-06-04
Payer: COMMERCIAL

## 2019-06-04 VITALS
RESPIRATION RATE: 16 BRPM | TEMPERATURE: 97.3 F | HEIGHT: 70 IN | SYSTOLIC BLOOD PRESSURE: 120 MMHG | HEART RATE: 86 BPM | BODY MASS INDEX: 28.35 KG/M2 | WEIGHT: 198 LBS | OXYGEN SATURATION: 98 % | DIASTOLIC BLOOD PRESSURE: 80 MMHG

## 2019-06-04 DIAGNOSIS — E55.9 VITAMIN D DEFICIENCY, UNSPECIFIED: ICD-10-CM

## 2019-06-04 PROCEDURE — 99214 OFFICE O/P EST MOD 30 MIN: CPT

## 2019-06-04 RX ORDER — DICLOFENAC SODIUM 75 MG/1
75 TABLET, DELAYED RELEASE ORAL
Qty: 1 | Refills: 0 | Status: DISCONTINUED | COMMUNITY
Start: 2019-04-24 | End: 2019-06-04

## 2019-06-04 RX ORDER — LIDOCAINE 5% 700 MG/1
5 PATCH TOPICAL
Qty: 30 | Refills: 2 | Status: DISCONTINUED | COMMUNITY
Start: 2019-05-07 | End: 2019-06-04

## 2019-06-04 NOTE — ASSESSMENT
----- Message from Valentino Bali, DO sent at 2/11/2017  5:09 PM CST -----  Ultrasound of the breast shows benign findings and nothing cancerous. It shows that this is a fibroadenoma which was biopsied in the past.  This is not cancer. [FreeTextEntry1] : 59 year old male found to have stable Essential Hypertension, Hypercholesterolemia, Elevated Hemoglobin A1c, Vitamin D Deficiency,with the current regimen, diet and life style modifications, as counseled. Prior results reviewed and discussed with the patient during today's examination. Plan as ordered.\par

## 2019-06-04 NOTE — HEALTH RISK ASSESSMENT
[No falls in past year] : Patient reported no falls in the past year [0] : 2) Feeling down, depressed, or hopeless: Not at all (0) [] : No [de-identified] : ORTHO [[id number]] : 0

## 2019-06-04 NOTE — HISTORY OF PRESENT ILLNESS
[de-identified] : 59 year old  male patient with history of stable Essential Hypertension, Hypercholesterolemia, Elevated Hemoglobin A1c, Vitamin D Deficiency, history as stated, presented for follow up examination. Patient is compliant with all medications. Denies shortness of breath, chest pain or abdominal pains at this time. ROS as stated.\par

## 2019-06-05 LAB
ALBUMIN SERPL ELPH-MCNC: 4.6 G/DL
ALP BLD-CCNC: 83 U/L
ALT SERPL-CCNC: 14 U/L
ANION GAP SERPL CALC-SCNC: 12 MMOL/L
APPEARANCE: CLEAR
AST SERPL-CCNC: 15 U/L
BASOPHILS # BLD AUTO: 0.06 K/UL
BASOPHILS NFR BLD AUTO: 1.1 %
BILIRUB SERPL-MCNC: 0.6 MG/DL
BILIRUBIN URINE: NEGATIVE
BLOOD URINE: NEGATIVE
BUN SERPL-MCNC: 7 MG/DL
CALCIUM SERPL-MCNC: 9.2 MG/DL
CHLORIDE SERPL-SCNC: 101 MMOL/L
CHOLEST SERPL-MCNC: 163 MG/DL
CHOLEST/HDLC SERPL: 3.9 RATIO
CO2 SERPL-SCNC: 28 MMOL/L
COLOR: NORMAL
CREAT SERPL-MCNC: 0.9 MG/DL
CREAT SPEC-SCNC: 45 MG/DL
EOSINOPHIL # BLD AUTO: 0.13 K/UL
EOSINOPHIL NFR BLD AUTO: 2.4 %
ERYTHROCYTE [SEDIMENTATION RATE] IN BLOOD BY WESTERGREN METHOD: 10 MM/HR
ESTIMATED AVERAGE GLUCOSE: 117 MG/DL
FOLATE SERPL-MCNC: >20 NG/ML
GGT SERPL-CCNC: 13 U/L
GLUCOSE QUALITATIVE U: NEGATIVE
GLUCOSE SERPL-MCNC: 109 MG/DL
HBA1C MFR BLD HPLC: 5.7 %
HCT VFR BLD CALC: 46.3 %
HCV AB SER QL: NONREACTIVE
HCV S/CO RATIO: 0.05 S/CO
HDLC SERPL-MCNC: 42 MG/DL
HGB BLD-MCNC: 15.1 G/DL
IMM GRANULOCYTES NFR BLD AUTO: 0.2 %
IRON SATN MFR SERPL: 23 %
IRON SERPL-MCNC: 82 UG/DL
KETONES URINE: NEGATIVE
LDLC SERPL CALC-MCNC: 98 MG/DL
LEUKOCYTE ESTERASE URINE: NEGATIVE
LYMPHOCYTES # BLD AUTO: 2.45 K/UL
LYMPHOCYTES NFR BLD AUTO: 45 %
MAN DIFF?: NORMAL
MCHC RBC-ENTMCNC: 30 PG
MCHC RBC-ENTMCNC: 32.6 GM/DL
MCV RBC AUTO: 91.9 FL
MICROALBUMIN 24H UR DL<=1MG/L-MCNC: <1.2 MG/DL
MICROALBUMIN/CREAT 24H UR-RTO: NORMAL MG/G
MONOCYTES # BLD AUTO: 0.36 K/UL
MONOCYTES NFR BLD AUTO: 6.6 %
NEUTROPHILS # BLD AUTO: 2.44 K/UL
NEUTROPHILS NFR BLD AUTO: 44.7 %
NITRITE URINE: NEGATIVE
PH URINE: 7.5
PLATELET # BLD AUTO: 199 K/UL
POTASSIUM SERPL-SCNC: 4.5 MMOL/L
PROT SERPL-MCNC: 7.8 G/DL
PROTEIN URINE: NEGATIVE
PSA FREE FLD-MCNC: 15 %
PSA FREE SERPL-MCNC: 0.93 NG/ML
PSA SERPL-MCNC: 6.24 NG/ML
RBC # BLD: 5.04 M/UL
RBC # FLD: 13.4 %
SODIUM SERPL-SCNC: 140 MMOL/L
SPECIFIC GRAVITY URINE: 1.01
T3 SERPL-MCNC: 145 NG/DL
T4 FREE SERPL-MCNC: 1.2 NG/DL
TIBC SERPL-MCNC: 353 UG/DL
TRIGL SERPL-MCNC: 117 MG/DL
TSH SERPL-ACNC: 1.79 UIU/ML
UIBC SERPL-MCNC: 271 UG/DL
UROBILINOGEN URINE: NORMAL
VIT B12 SERPL-MCNC: 1150 PG/ML
WBC # FLD AUTO: 5.45 K/UL

## 2019-10-17 ENCOUNTER — APPOINTMENT (OUTPATIENT)
Dept: OPHTHALMOLOGY | Facility: CLINIC | Age: 59
End: 2019-10-17
Payer: COMMERCIAL

## 2019-10-17 ENCOUNTER — NON-APPOINTMENT (OUTPATIENT)
Age: 59
End: 2019-10-17

## 2019-10-17 ENCOUNTER — APPOINTMENT (OUTPATIENT)
Dept: OPHTHALMOLOGY | Facility: CLINIC | Age: 59
End: 2019-10-17

## 2019-10-17 PROCEDURE — 92012 INTRM OPH EXAM EST PATIENT: CPT

## 2019-10-17 PROCEDURE — 92015 DETERMINE REFRACTIVE STATE: CPT

## 2019-10-17 PROCEDURE — 92083 EXTENDED VISUAL FIELD XM: CPT

## 2019-12-17 ENCOUNTER — APPOINTMENT (OUTPATIENT)
Dept: ORTHOPEDIC SURGERY | Facility: CLINIC | Age: 59
End: 2019-12-17
Payer: COMMERCIAL

## 2019-12-17 VITALS — HEIGHT: 71 IN | BODY MASS INDEX: 27.58 KG/M2 | WEIGHT: 197 LBS

## 2019-12-17 PROCEDURE — 73502 X-RAY EXAM HIP UNI 2-3 VIEWS: CPT | Mod: LT

## 2019-12-17 PROCEDURE — 99214 OFFICE O/P EST MOD 30 MIN: CPT

## 2019-12-17 NOTE — DISCUSSION/SUMMARY
[de-identified] : This patient has left hip trochanteric bursitis. An extensive discussion was conducted on the natural history of the disease and the variety of surgical and non-surgical options available to the patient including, but not limited to non-steroidal anti-inflammatory medications, steroid injections, physical therapy, maintenance of ideal body weight, and reduction of activity. Recommended a home exercise program and use of a foam roller. He can use over-the-counter NSAIDs PRN pain. The patient will schedule an appointment as needed.

## 2019-12-17 NOTE — PHYSICAL EXAM
[de-identified] : Patient is well nourished, well-developed, in no acute distress, with appropriate mood and affect. The patient is oriented to time, place, and person. Respirations are even and unlabored. Gait evaluation does not reveal a limp. There is no inguinal adenopathy. Examination of the contralateral hip shows normal range of motion, strength, no tenderness, and intact skin. The affected limb is well-perfused and showed 2+ dp/pt pulses, without skin lesions, shows a grossly normal motor and sensory examination. Examination of the hip shows no skin lesions. Hip motion is full and painless from 0-90 degrees extension to flexion, 20 degrees adduction and 20 degrees abduction, and 15 degrees internal and 30 degrees external rotation. Leg lengths are approximately equal. FADIR is negative and CARLOS is negative. Stinchfield test is negative. Both hips are stable and muscle strength is normal with good strength with resisted abduction and adduction. Pedal pulses are palpable. Tender to palpation about the trochanteric bursa.  [de-identified] : AP and lateral x-rays of the left hip, pelvis, and femur were ordered and taken in the office and demonstrate no evidence of degenerative joint disease of the hip with maintained joint space and no evidence of fractures or other intraarticular pathology.

## 2019-12-17 NOTE — HISTORY OF PRESENT ILLNESS
[de-identified] : This is very nice 59-year-old gentleman experiencing left lateral hip pain, which is severe in intensity. No groin pain. The pain mildly limits activities of daily living. Walking tolerance is not reduced.  He does not take medications for this.  He does not use a cane or walker.  He is not using physical therapy. The patient denies any radiation of the pain to the feet and it is not associated with numbness, tingling, or weakness.

## 2020-01-04 ENCOUNTER — RX RENEWAL (OUTPATIENT)
Age: 60
End: 2020-01-04

## 2020-01-04 ENCOUNTER — TRANSCRIPTION ENCOUNTER (OUTPATIENT)
Age: 60
End: 2020-01-04

## 2020-02-23 NOTE — COUNSELING
[Activity counseling provided] : activity [Healthy eating counseling provided] : healthy eating [Weight management counseling provided] : Weight management [None] : None [Good understanding] : Patient has a good understanding of lifestyle changes and the steps needed to achieve self management goals

## 2020-02-25 ENCOUNTER — APPOINTMENT (OUTPATIENT)
Dept: INTERNAL MEDICINE | Facility: CLINIC | Age: 60
End: 2020-02-25
Payer: COMMERCIAL

## 2020-02-25 ENCOUNTER — NON-APPOINTMENT (OUTPATIENT)
Age: 60
End: 2020-02-25

## 2020-02-25 VITALS
OXYGEN SATURATION: 99 % | TEMPERATURE: 97.9 F | WEIGHT: 198 LBS | BODY MASS INDEX: 27.72 KG/M2 | HEART RATE: 71 BPM | DIASTOLIC BLOOD PRESSURE: 74 MMHG | SYSTOLIC BLOOD PRESSURE: 121 MMHG | RESPIRATION RATE: 16 BRPM | HEIGHT: 71 IN

## 2020-02-25 PROCEDURE — 99396 PREV VISIT EST AGE 40-64: CPT | Mod: 25

## 2020-02-25 PROCEDURE — 93000 ELECTROCARDIOGRAM COMPLETE: CPT

## 2020-02-25 RX ORDER — GABAPENTIN 100 MG/1
100 CAPSULE ORAL 3 TIMES DAILY
Qty: 90 | Refills: 5 | Status: DISCONTINUED | COMMUNITY
Start: 2018-09-28 | End: 2020-02-25

## 2020-02-25 NOTE — ASSESSMENT
[FreeTextEntry1] : 60 year old male found to have stable  Essential Hypertension, Hypercholesterolemia, Elevated Hemoglobin A1c, Vitamin D Deficiency,with the current regimen, diet and life style modifications, as counseled. Prior results reviewed and discussed with the patient during today's examination. Plan as ordered.\par Patient was recently evaluated by CARD , findings and recommendations reviewed with the patient during today's examination.\par EKG showed NSR at the rate of 62 BPM, non-sp ST-T changes noted.\par

## 2020-02-25 NOTE — BRIEF OPERATIVE NOTE - ELECTIVE PROCEDURE
Patient interviewed and examined.    CHIEF COMPLAINT  Chief Complaint   Patient presents with   • Irregular Heart Beat        HPI  Patient is a 53 y/o female with a h/o pituitary tumor, lung nodule, who presents to the ED with her daughter for c/o worsening irregular HR that began 1 day ago. Patient reports that for the past 3 weeks she has been experiencing constant palpitations. The palpitations tend to occur more when she is at rest, particularly before bedtime. She states she can feel her \"heart beat in her eye,\" will be SOB, and had a cough after the palpitations. Today, her palpitations made her lightheaded for a couple of seconds, so she decided to come into the ED for further evaluation. Approximately 2 weeks ago, before her palpitations started, she had a bout of chest tightness, nausea, and lightheadedness at [employer], which is the first and last time she has had those symptoms. She states that she has decreased her caffeine consumption and does not drink a lot of alcohol. Patient denies N/V/D, abdominal pain, CP, and any other pertinent complaints. Of note, patient's father had a MI at a young age.        ROS  Constitutional: No fever, fatigue or weight loss.  Skin: No rash  Eyes: No recent vision problems or eye pain  ENT: No congestion, ear pain, or sore throat  Cardiovascular: Positive palpitations and dizziness. No chest pain  Respiratory: Positive SOB and cough. No wheezing.  Gastrointestinal: No abdominal pain, nausea, vomiting, or diarrhea  Genitourinary: No dysuria  Musculoskeletal: No joint swelling  Neurologic: No headache.  Hematologic: No unusual bruising or bleeding.  Otherwise 10-point review of systems reviewed and otherwise negative.        MEDICATIONS  No current facility-administered medications for this encounter.      Current Outpatient Medications   Medication Sig Dispense Refill   • ergocalciferol (DRISDOL) 1.25 mg (50,000 units) capsule Take 1 capsule by mouth 1 day a week. 6 capsule  0   • pantoprazole (PROTONIX) 40 MG tablet        ALLERGIES  Patient denies any allergies.    PAST MEDICAL HX    Arthralgia                                                    Pituitary adenoma (CMS/HCC)                                   Pituitary tumor                                               Lung nodule                                                   Patient Active Problem List   Diagnosis   • History of pituitary adenoma   • Hallux valgus, acquired   • Rotator cuff tear, right   • Pure hypercholesterolemia   • Gastroesophageal reflux disease without esophagitis   • Frequency of micturition   • Encounter for preventive health examination   • Pituitary adenoma (CMS/HCC)   • Obesity (BMI 30.0-34.9)   • Vitamin D deficiency   • Snoring   • Myalgia   • Pain of left calf     PAST SURGICAL HX    OOPHORECTOMY                                                    Comment: ectopic pregnancy    FOOT SURGERY                                                    Comment: bunions and also cysts    SHOULDER SURGERY                                                Comment: rotator cuff    HAND SURGERY                                                    Comment: injry after picked up smth heavy    PAST FAMILY HX  Family History   Problem Relation Age of Onset   • Melanoma Mother    • Stroke Mother    • Cancer, Breast Mother    • Other Mother    • Myocardial Infarction Father    • Cancer Father      Review of patient's family status indicates:    Mother                                                   Father                                                   PAST SOCIAL HX  Social History     Tobacco Use   • Smoking status: Never Smoker   • Smokeless tobacco: Never Used   Substance Use Topics   • Alcohol use: Not Currently   • Drug use: Never   Patient denies any alcohol use, tobacco use or illicit drug use. Patient states that she lives in a private residence.        PE  Vitals:    02/24/20 1755 02/24/20 2100 02/24/20 2130   BP:  129/74 123/69 128/80   Pulse: (!) 112 67 66   Resp: 16 12 14   Temp: 97.5 °F (36.4 °C)     TempSrc: Oral     SpO2: 99% 97% 96%   Weight: 90.7 kg (200 lb)     Height: 5' 7\" (1.702 m)     Nursing note reviewed.   VITAL SIGNS: Reviewed.   General: Alert. No acute distress.  Skin: Warm. Dry. Normal for ethnicity.  Head: Normocephalic. Atraumatic.  EYE: PERRL. EOMI. Normal Conjunctiva.   ENMT: Oral  mucosa moist. No pharyngeal erythema or exudate. Oropharynx normal. Uvula midline  Cardiovascular: Regular rate, rhythm. No murmur. Normal peripheral perfusion. No edema  Respiratory: Lungs CTA. Non-labored respiration. BS equal. Symmetrical Expansion.  Gastrointestinal: Soft. Non tender. Non distended.   Musculoskeletal: Normal ROM. No deformity.  Neurological: AOx4. Normal motor Normal Speech. Normal sensory  Pschy: Cooperative. Appropriate mood & affect.        RESULTS  Cardiac Monitor  02/24/2020 at 19:42  65 bpm. NSR.     EKG  02/24/2020 at 18:02  Sinus rhythm with occasional PVC's. Otherwise normal ECG. No old for comparison.  EKG interpreted by Dr. Reina    02/24/2020 at 21:30  68 bpm. NSR. Normal ECG.  EKG interpreted by Dr. Reina      Laboratory Results   Results for orders placed or performed during the hospital encounter of 02/24/20   CBC with Automated Differential   Result Value    WBC 8.0    RBC 4.66    HGB 13.8    HCT 41.8    MCV 89.7    MCH 29.6    MCHC 33.0    RDW-CV 12.8        NRBC 0    DIFF TYPE AUTOMATED DIFFERENTIAL    Neutrophil 62    LYMPH 30    MONO 6    EOSIN 2    BASO 0    Percent Immature Granuloctyes 0    Absolute Neutrophil 4.9    Absolute Lymph 2.4    Absolute Mono 0.5    Absolute Eos 0.2    Absolute Baso 0.0    Absolute Immature Granulocytes 0.0   Comprehensive Metabolic Panel   Result Value    Sodium 143    Potassium 3.6    Chloride 107    Carbon Dioxide 26    Anion Gap 14    Glucose 91    BUN 18    Creatinine 1.15 (H)    GFR Estimate,  62     Comment: eGFR 60 - 89  Yes mL/min/1.73m2 = Mild decrease in kidney function.    GFR Estimate, Non  53     Comment: eGFR 30-59 mL/min/1.73m2 = Moderate decrease in kidney function. Stage 3 CKD (chronic kidney disease) or moderate kidney disease.    BUN/Creatinine Ratio 16    CALCIUM 9.4    TOTAL BILIRUBIN 0.5    AST/SGOT 21    ALT/SGPT 29    ALK PHOSPHATASE 66    TOTAL PROTEIN 7.5    Albumin 4.0    GLOBULIN 3.5    A/G Ratio, Serum 1.1   Magnesium   Result Value    MAGNESIUM 2.0   Troponin I Ultra Sensitive   Result Value    TROPONIN I 0.02   Troponin I Ultra Sensitive   Result Value    TROPONIN I 0.02   Prothrombin Time   Result Value    PROTIME 10.6    INR 1.0     Comment: INR Therapeutic Range: 2.0 to 3.0 (2.5 to 3.5 recommended for recurrent thrombotic episodes and mechanical prosthetic heart valves.)    Partial Thromboplastin Time   Result Value    PTT 28     Comment: PTT  Therapeutic Range:  45-70 seconds.   Thyroid Stimulating Hormone Reflex   Result Value    TSH 1.478     Comment: Findings most consistent with euthyroid state, no additional testing suggested. TSH may be normal in patients with thyroid dysfunction and pituitary disease. Clinical correlation recommended.  (Reflex TSH algorithm is not recommended in hospitalized patients. A variety of drugs, as well as serious acute and chronic illnesses may alter thyroid function tests. Commonly implicated drugs include glucocorticoids, dopamine, carbamazepine, iodine,   amiodarone, lithium and heparin.)          Radiology Results  Imaging Results          XR CHEST PA OR AP 1 VIEW (Final result)  Result time 02/24/20 20:30:09    Final result                 Impression:       No evidence of acute cardiopulmonary process.    1.6 cm left upper lobe pulmonary nodule.  Further evaluation with CT chest is recommended. Code 10 - Incidental Finding in ER case with appropriate non-emergent follow-up as described.    Electronically Signed by: IMMANUEL CONLEY MD   Signed on:  2/24/2020 8:30 PM                Narrative:    EXAM: XR CHEST PA OR AP 1 VIEW    CLINICAL INDICATION: Chest pain.    COMPARISON: None.    FINDINGS: Normal heart size and mediastinal contours.  1.6 mm pulmonary nodule in the left upper lobe.  No focal consolidation, pleural effusion or pneumothorax.                                  HEART SCORE  History   (_) Highly suspicious       +2   (_) Moderately suspicious  +1   (x) Slightly suspicious     0    EKG    (_) Significant ST-depression   +2   (_) Non-specific repolarization disturbance   +1    (x) Normal  0    Age    (_) 65 >   +2   (x) 45-65     +1   (_) Less than 45  0    Risk Factors (including hypercholesterolemia, hypertension, diabetes mellitus,  cigarette smoking, positive family history, obesity)   (_) 3 risk factors or history of atherosclerotic disease  +2   (x) 1-2 risk factors  +1   (_) No risk factors  0    Troponin   (_) 3x normal limit   +2   (_) 1-3x normal limit   +1   (x) Normal Troponin  0    Total: 2    Low score (0-3 points), risk of MACE of 0.9-1.7%      OBSERVATION CARE  Patient has a family history of MI. Patient first seen at 19:28. Observation began at 19:30 and was necessary in order to determine management decision. Upon re-evaluation, observation revealed that the patient should be discharged from the ED. Patient discharged at 22:30, total time of observation 3 hours.         CLINICAL COURSE  ED Course as of Feb 24 2223   Mon Feb 24, 2020 1947 Patient in generally good health.  For several weeks has had palpitations.  Today she had a few seconds of dizziness with it.  No chest pain shortness of breath leg pain or swelling.  No change in diet, medications caffeine or alcohol.  She is alert oriented vitals are normal she does have normal sinus rhythm normal rate on the monitor with occasional PVCs which are unifocal.  Otherwise normal exam.      2038 Work-up so far benign.  Patient remains asymptomatic.  I discussed with her PVCs most  likely benign.  She is comfortable would like to go home if able.  Will repeat second troponin if negative she will be discharged to follow-up with cardiology and primary care.      2157 Patient's repeat EKG normal repeat troponin negative.  Patient's work-up negative she is comfortable with discharge to follow-up with primary care and also cardiology.      2223 Discussed with patient work appears negative however she needs to follow-up with cardiology she probably needs an echo and a Holter monitor.  I explained while the acute work-up is negative she still needs full cardiac evaluation to rule out any other causes.  She understands this.  She knows even though everything here was normal and that she still needs to go to the cardiology and primary care office.             IMPRESSION   ED Diagnosis        Final diagnosis    PVC's (premature ventricular contractions)                   DISPOSITION and PLAN  Patient discharged from the ED on 02/24/2020 at 22:30.      Syed Moise, DO  [address] [facility] [address]  [phone]      primary care    Tree Melendez MD  [address] [place] [address]  [address] [facility] [address]  [phone]    Schedule an appointment as soon as possible for a visit   cardiology           Summary of your Discharge Medications      You have not been prescribed any medications.          Rose Briceno is acting as a medical scribe for ED Clinician Dr. Reina on 02/24/2020.    This documentation accurately reflects the work and decisions made by me, Benja Reina DO.     Benja Reina,   02/24/20 2205       Benja Reina DO  02/24/20 2209       Benja Reina,   02/24/20 2223

## 2020-02-25 NOTE — HISTORY OF PRESENT ILLNESS
[de-identified] : 60 year old male patient with history of stable Essential Hypertension, Hypercholesterolemia, Elevated Hemoglobin A1c, Vitamin D Deficiency, history as stated, presented for an annual preventative examination. Patient denies any associated symptoms of shortness of breath, chest pain, abdominal pain at this time.\par \par

## 2020-02-25 NOTE — HEALTH RISK ASSESSMENT
[Good] : ~his/her~ current health as good [No] : No [No falls in past year] : Patient reported no falls in the past year [0] : 1) Little interest or pleasure doing things: Not at all (0) [Patient declined colonoscopy] : Patient declined colonoscopy [HIV test declined] : HIV test declined [None] : None [Feels Safe at Home] : Feels safe at home [Fully functional (using the telephone, shopping, preparing meals, housekeeping, doing laundry, using] : Fully functional and needs no help or supervision to perform IADLs (using the telephone, shopping, preparing meals, housekeeping, doing laundry, using transportation, managing medications and managing finances) [Fully functional (bathing, dressing, toileting, transferring, walking, feeding)] : Fully functional (bathing, dressing, toileting, transferring, walking, feeding) [Smoke Detector] : smoke detector [Carbon Monoxide Detector] : carbon monoxide detector [Seat Belt] :  uses seat belt [Sunscreen] : uses sunscreen [With Patient/Caregiver] : With Patient/Caregiver [Discussed at today's visit] : Advance Directives Discussed at today's visit [FreeTextEntry1] : Check up\par  [] : No [de-identified] : ORTHO [[id number]] : 0 [Change in mental status noted] : No change in mental status noted [Reports changes in hearing] : Reports no changes in hearing [Reports changes in vision] : Reports no changes in vision [Reports changes in dental health] : Reports no changes in dental health [HepatitisCDate] : 06/19 [HepatitisCComments] : Negative [AdvancecareDate] : 02/20

## 2020-02-26 LAB
25(OH)D3 SERPL-MCNC: 47.4 NG/ML
ALBUMIN SERPL ELPH-MCNC: 4.3 G/DL
ALP BLD-CCNC: 73 U/L
ALT SERPL-CCNC: 17 U/L
ANION GAP SERPL CALC-SCNC: 11 MMOL/L
APPEARANCE: CLEAR
AST SERPL-CCNC: 17 U/L
BASOPHILS # BLD AUTO: 0.04 K/UL
BASOPHILS NFR BLD AUTO: 0.8 %
BILIRUB SERPL-MCNC: 0.5 MG/DL
BILIRUBIN URINE: NEGATIVE
BLOOD URINE: NEGATIVE
BUN SERPL-MCNC: 5 MG/DL
CALCIUM SERPL-MCNC: 9.2 MG/DL
CHLORIDE SERPL-SCNC: 102 MMOL/L
CHOLEST SERPL-MCNC: 157 MG/DL
CHOLEST/HDLC SERPL: 3.7 RATIO
CO2 SERPL-SCNC: 27 MMOL/L
COLOR: COLORLESS
CREAT SERPL-MCNC: 0.8 MG/DL
CREAT SPEC-SCNC: 22 MG/DL
EOSINOPHIL # BLD AUTO: 0.12 K/UL
EOSINOPHIL NFR BLD AUTO: 2.4 %
ERYTHROCYTE [SEDIMENTATION RATE] IN BLOOD BY WESTERGREN METHOD: 19 MM/HR
ESTIMATED AVERAGE GLUCOSE: 120 MG/DL
FOLATE SERPL-MCNC: >20 NG/ML
GGT SERPL-CCNC: 13 U/L
GLUCOSE QUALITATIVE U: NEGATIVE
GLUCOSE SERPL-MCNC: 94 MG/DL
HBA1C MFR BLD HPLC: 5.8 %
HCT VFR BLD CALC: 45.2 %
HCV AB SER QL: NONREACTIVE
HCV S/CO RATIO: 0.1 S/CO
HDLC SERPL-MCNC: 43 MG/DL
HGB BLD-MCNC: 14.5 G/DL
IMM GRANULOCYTES NFR BLD AUTO: 0.2 %
IRON SATN MFR SERPL: 35 %
IRON SERPL-MCNC: 114 UG/DL
KETONES URINE: NEGATIVE
LDLC SERPL CALC-MCNC: 93 MG/DL
LEUKOCYTE ESTERASE URINE: NEGATIVE
LYMPHOCYTES # BLD AUTO: 2.27 K/UL
LYMPHOCYTES NFR BLD AUTO: 45.6 %
MAN DIFF?: NORMAL
MCHC RBC-ENTMCNC: 30.3 PG
MCHC RBC-ENTMCNC: 32.1 GM/DL
MCV RBC AUTO: 94.6 FL
MICROALBUMIN 24H UR DL<=1MG/L-MCNC: <1.2 MG/DL
MICROALBUMIN/CREAT 24H UR-RTO: NORMAL MG/G
MONOCYTES # BLD AUTO: 0.39 K/UL
MONOCYTES NFR BLD AUTO: 7.8 %
NEUTROPHILS # BLD AUTO: 2.15 K/UL
NEUTROPHILS NFR BLD AUTO: 43.2 %
NITRITE URINE: NEGATIVE
PH URINE: 7.5
PLATELET # BLD AUTO: 202 K/UL
POTASSIUM SERPL-SCNC: 4.6 MMOL/L
PROT SERPL-MCNC: 6.9 G/DL
PROTEIN URINE: NEGATIVE
PSA FREE FLD-MCNC: 14 %
PSA FREE SERPL-MCNC: 0.75 NG/ML
PSA SERPL-MCNC: 5.36 NG/ML
RBC # BLD: 4.78 M/UL
RBC # FLD: 13.5 %
SODIUM SERPL-SCNC: 140 MMOL/L
SPECIFIC GRAVITY URINE: 1
T3 SERPL-MCNC: 132 NG/DL
T4 FREE SERPL-MCNC: 1.2 NG/DL
TIBC SERPL-MCNC: 323 UG/DL
TRIGL SERPL-MCNC: 103 MG/DL
TSH SERPL-ACNC: 1.35 UIU/ML
UIBC SERPL-MCNC: 209 UG/DL
UROBILINOGEN URINE: NORMAL
VIT B12 SERPL-MCNC: 805 PG/ML
WBC # FLD AUTO: 4.98 K/UL

## 2020-07-30 ENCOUNTER — APPOINTMENT (OUTPATIENT)
Dept: OPHTHALMOLOGY | Facility: CLINIC | Age: 60
End: 2020-07-30
Payer: COMMERCIAL

## 2020-07-30 ENCOUNTER — NON-APPOINTMENT (OUTPATIENT)
Age: 60
End: 2020-07-30

## 2020-07-30 PROCEDURE — 92133 CPTRZD OPH DX IMG PST SGM ON: CPT

## 2020-07-30 PROCEDURE — 92083 EXTENDED VISUAL FIELD XM: CPT

## 2020-07-30 PROCEDURE — 92014 COMPRE OPH EXAM EST PT 1/>: CPT

## 2020-10-06 ENCOUNTER — APPOINTMENT (OUTPATIENT)
Dept: INTERNAL MEDICINE | Facility: CLINIC | Age: 60
End: 2020-10-06
Payer: COMMERCIAL

## 2020-10-06 VITALS
BODY MASS INDEX: 28 KG/M2 | OXYGEN SATURATION: 98 % | RESPIRATION RATE: 16 BRPM | WEIGHT: 200 LBS | SYSTOLIC BLOOD PRESSURE: 106 MMHG | TEMPERATURE: 97.6 F | HEIGHT: 71 IN | HEART RATE: 72 BPM | DIASTOLIC BLOOD PRESSURE: 71 MMHG

## 2020-10-06 DIAGNOSIS — Z11.59 ENCOUNTER FOR SCREENING FOR OTHER VIRAL DISEASES: ICD-10-CM

## 2020-10-06 PROCEDURE — G0009: CPT

## 2020-10-06 PROCEDURE — 90670 PCV13 VACCINE IM: CPT

## 2020-10-06 PROCEDURE — 99214 OFFICE O/P EST MOD 30 MIN: CPT | Mod: 25

## 2020-10-06 NOTE — HISTORY OF PRESENT ILLNESS
[de-identified] : 60 year old  male patient with history of stable Essential Hypertension, Hypercholesterolemia, Elevated Hemoglobin A1c, Vitamin D Deficiency, BPH without Urinary Obstruction, history as stated, presented for follow up examination. Patient is compliant with all medications. Denies shortness of breath, chest pain or abdominal pains at this time. ROS as stated.\par

## 2020-10-06 NOTE — HEALTH RISK ASSESSMENT
[No] : In the past 12 months have you used drugs other than those required for medical reasons? No [No falls in past year] : Patient reported no falls in the past year [0] : 2) Feeling down, depressed, or hopeless: Not at all (0) [] : No [de-identified] : None [[id number]] : 0

## 2020-10-06 NOTE — ASSESSMENT
[FreeTextEntry1] : 60 year old male found to have stable Essential Hypertension, Hypercholesterolemia, Elevated Hemoglobin A1c, Vitamin D Deficiency, BPH without Urinary Obstruction, with the current regimen, diet and life style modifications, as counseled. Prior results reviewed and discussed with the patient during today's examination. Plan as ordered.\par

## 2020-10-07 LAB
ALBUMIN SERPL ELPH-MCNC: 4.4 G/DL
ALP BLD-CCNC: 81 U/L
ALT SERPL-CCNC: 14 U/L
ANION GAP SERPL CALC-SCNC: 7 MMOL/L
AST SERPL-CCNC: 17 U/L
BASOPHILS # BLD AUTO: 0.04 K/UL
BASOPHILS NFR BLD AUTO: 0.7 %
BILIRUB SERPL-MCNC: 0.6 MG/DL
BUN SERPL-MCNC: 7 MG/DL
CALCIUM SERPL-MCNC: 9.3 MG/DL
CHLORIDE SERPL-SCNC: 101 MMOL/L
CHOLEST SERPL-MCNC: 196 MG/DL
CHOLEST/HDLC SERPL: 5 RATIO
CO2 SERPL-SCNC: 29 MMOL/L
CREAT SERPL-MCNC: 0.94 MG/DL
EOSINOPHIL # BLD AUTO: 0.12 K/UL
EOSINOPHIL NFR BLD AUTO: 2.1 %
ESTIMATED AVERAGE GLUCOSE: 126 MG/DL
GGT SERPL-CCNC: 14 U/L
GLUCOSE SERPL-MCNC: 93 MG/DL
HBA1C MFR BLD HPLC: 6 %
HCT VFR BLD CALC: 44.6 %
HDLC SERPL-MCNC: 40 MG/DL
HGB BLD-MCNC: 14.5 G/DL
IMM GRANULOCYTES NFR BLD AUTO: 0.2 %
LDLC SERPL CALC-MCNC: 130 MG/DL
LYMPHOCYTES # BLD AUTO: 2.53 K/UL
LYMPHOCYTES NFR BLD AUTO: 43.8 %
MAN DIFF?: NORMAL
MCHC RBC-ENTMCNC: 29.9 PG
MCHC RBC-ENTMCNC: 32.5 GM/DL
MCV RBC AUTO: 92 FL
MONOCYTES # BLD AUTO: 0.38 K/UL
MONOCYTES NFR BLD AUTO: 6.6 %
NEUTROPHILS # BLD AUTO: 2.69 K/UL
NEUTROPHILS NFR BLD AUTO: 46.6 %
PLATELET # BLD AUTO: 206 K/UL
POTASSIUM SERPL-SCNC: 4.5 MMOL/L
PROT SERPL-MCNC: 6.8 G/DL
PSA FREE FLD-MCNC: 18 %
PSA FREE SERPL-MCNC: 0.9 NG/ML
PSA SERPL-MCNC: 5.03 NG/ML
RBC # BLD: 4.85 M/UL
RBC # FLD: 13.2 %
SARS-COV-2 IGG SERPL IA-ACNC: <3.8 AU/ML
SARS-COV-2 IGG SERPL QL IA: NEGATIVE
SODIUM SERPL-SCNC: 137 MMOL/L
TRIGL SERPL-MCNC: 131 MG/DL
WBC # FLD AUTO: 5.77 K/UL

## 2020-10-28 ENCOUNTER — TRANSCRIPTION ENCOUNTER (OUTPATIENT)
Age: 60
End: 2020-10-28

## 2020-10-28 RX ORDER — ZOSTER VACCINE RECOMBINANT, ADJUVANTED 50 MCG/0.5
50 KIT INTRAMUSCULAR
Qty: 0.5 | Refills: 1 | Status: ACTIVE | COMMUNITY
Start: 2020-10-28 | End: 1900-01-01

## 2021-04-12 ENCOUNTER — APPOINTMENT (OUTPATIENT)
Dept: OPHTHALMOLOGY | Facility: CLINIC | Age: 61
End: 2021-04-12

## 2021-04-27 ENCOUNTER — LABORATORY RESULT (OUTPATIENT)
Age: 61
End: 2021-04-27

## 2021-04-27 ENCOUNTER — APPOINTMENT (OUTPATIENT)
Dept: INTERNAL MEDICINE | Facility: CLINIC | Age: 61
End: 2021-04-27
Payer: COMMERCIAL

## 2021-04-27 ENCOUNTER — NON-APPOINTMENT (OUTPATIENT)
Age: 61
End: 2021-04-27

## 2021-04-27 VITALS
OXYGEN SATURATION: 98 % | BODY MASS INDEX: 28.56 KG/M2 | TEMPERATURE: 98.4 F | SYSTOLIC BLOOD PRESSURE: 111 MMHG | HEIGHT: 71 IN | HEART RATE: 70 BPM | RESPIRATION RATE: 16 BRPM | WEIGHT: 204 LBS | DIASTOLIC BLOOD PRESSURE: 78 MMHG

## 2021-04-27 DIAGNOSIS — Z12.11 ENCOUNTER FOR SCREENING FOR MALIGNANT NEOPLASM OF COLON: ICD-10-CM

## 2021-04-27 DIAGNOSIS — Z12.5 ENCOUNTER FOR SCREENING FOR MALIGNANT NEOPLASM OF PROSTATE: ICD-10-CM

## 2021-04-27 PROCEDURE — 99396 PREV VISIT EST AGE 40-64: CPT | Mod: 25

## 2021-04-27 PROCEDURE — 93000 ELECTROCARDIOGRAM COMPLETE: CPT

## 2021-04-27 PROCEDURE — 99072 ADDL SUPL MATRL&STAF TM PHE: CPT

## 2021-04-27 NOTE — HISTORY OF PRESENT ILLNESS
[de-identified] : 61 year old  male patient with history of stable Essential Hypertension, Hypercholesterolemia, Elevated Hemoglobin A1c, Vitamin D Deficiency, BPH without Urinary Obstruction, history as stated, presented for follow up examination. Patient is compliant with all medications. Denies shortness of breath, chest pain or abdominal pains at this time. ROS as stated.\par

## 2021-04-27 NOTE — ASSESSMENT
[FreeTextEntry1] : 61 year old male found to have stable Essential Hypertension, Hypercholesterolemia, Elevated Hemoglobin A1c, Vitamin D Deficiency, BPH without Urinary Obstruction,with the current prescription regimen as recommended, diet and life style modifications, as counseled. Prior results reviewed, interpreted and discussed with the patient during today's examination, as appropriate. Follow up, treatment plan and tests, as ordered.\par \par EKG showed NSR at the rate of 64 BPM, LAD, non-sp ST-T changes noted.\par

## 2021-04-27 NOTE — HEALTH RISK ASSESSMENT
[No] : In the past 12 months have you used drugs other than those required for medical reasons? No [No falls in past year] : Patient reported no falls in the past year [0] : 2) Feeling down, depressed, or hopeless: Not at all (0) [] : No [de-identified] : None [[id number]] : 0

## 2021-04-28 ENCOUNTER — TRANSCRIPTION ENCOUNTER (OUTPATIENT)
Age: 61
End: 2021-04-28

## 2021-04-28 ENCOUNTER — NON-APPOINTMENT (OUTPATIENT)
Age: 61
End: 2021-04-28

## 2021-04-29 LAB
25(OH)D3 SERPL-MCNC: 43.2 NG/ML
ALBUMIN SERPL ELPH-MCNC: 4.3 G/DL
ALP BLD-CCNC: 83 U/L
ALT SERPL-CCNC: 15 U/L
ANION GAP SERPL CALC-SCNC: 14 MMOL/L
APPEARANCE: CLEAR
AST SERPL-CCNC: 17 U/L
BASOPHILS # BLD AUTO: 0.04 K/UL
BASOPHILS NFR BLD AUTO: 0.7 %
BILIRUB SERPL-MCNC: 0.5 MG/DL
BILIRUBIN URINE: NEGATIVE
BLOOD URINE: NEGATIVE
BUN SERPL-MCNC: 6 MG/DL
CALCIUM SERPL-MCNC: 9.5 MG/DL
CHLORIDE SERPL-SCNC: 101 MMOL/L
CHOLEST SERPL-MCNC: 174 MG/DL
CO2 SERPL-SCNC: 25 MMOL/L
COLOR: NORMAL
CREAT SERPL-MCNC: 0.88 MG/DL
CREAT SPEC-SCNC: 49 MG/DL
EOSINOPHIL # BLD AUTO: 0.12 K/UL
EOSINOPHIL NFR BLD AUTO: 2.2 %
ERYTHROCYTE [SEDIMENTATION RATE] IN BLOOD BY WESTERGREN METHOD: 10 MM/HR
ESTIMATED AVERAGE GLUCOSE: 123 MG/DL
FOLATE SERPL-MCNC: >20 NG/ML
GGT SERPL-CCNC: 12 U/L
GLUCOSE QUALITATIVE U: NEGATIVE
GLUCOSE SERPL-MCNC: 99 MG/DL
HBA1C MFR BLD HPLC: 5.9 %
HCT VFR BLD CALC: 45.7 %
HDLC SERPL-MCNC: 36 MG/DL
HGB BLD-MCNC: 14.5 G/DL
IMM GRANULOCYTES NFR BLD AUTO: 0 %
IRON SATN MFR SERPL: 37 %
IRON SERPL-MCNC: 127 UG/DL
KETONES URINE: NEGATIVE
LDLC SERPL CALC-MCNC: 103 MG/DL
LEUKOCYTE ESTERASE URINE: NEGATIVE
LYMPHOCYTES # BLD AUTO: 2.72 K/UL
LYMPHOCYTES NFR BLD AUTO: 49.8 %
MAN DIFF?: NORMAL
MCHC RBC-ENTMCNC: 29.4 PG
MCHC RBC-ENTMCNC: 31.7 GM/DL
MCV RBC AUTO: 92.5 FL
MICROALBUMIN 24H UR DL<=1MG/L-MCNC: <1.2 MG/DL
MICROALBUMIN/CREAT 24H UR-RTO: NORMAL MG/G
MONOCYTES # BLD AUTO: 0.38 K/UL
MONOCYTES NFR BLD AUTO: 7 %
NEUTROPHILS # BLD AUTO: 2.2 K/UL
NEUTROPHILS NFR BLD AUTO: 40.3 %
NITRITE URINE: NEGATIVE
NONHDLC SERPL-MCNC: 139 MG/DL
PH URINE: 7.5
PLATELET # BLD AUTO: 196 K/UL
POTASSIUM SERPL-SCNC: 4.1 MMOL/L
PROT SERPL-MCNC: 7 G/DL
PROTEIN URINE: NEGATIVE
PSA FREE FLD-MCNC: 15 %
PSA FREE SERPL-MCNC: 0.71 NG/ML
PSA SERPL-MCNC: 4.57 NG/ML
RBC # BLD: 4.94 M/UL
RBC # FLD: 13.4 %
SODIUM SERPL-SCNC: 139 MMOL/L
SPECIFIC GRAVITY URINE: 1.01
T3 SERPL-MCNC: 136 NG/DL
T4 FREE SERPL-MCNC: 1.1 NG/DL
TIBC SERPL-MCNC: 344 UG/DL
TRIGL SERPL-MCNC: 178 MG/DL
TSH SERPL-ACNC: 2.17 UIU/ML
UIBC SERPL-MCNC: 217 UG/DL
UROBILINOGEN URINE: NORMAL
VIT B12 SERPL-MCNC: 654 PG/ML
WBC # FLD AUTO: 5.46 K/UL

## 2021-05-04 ENCOUNTER — NON-APPOINTMENT (OUTPATIENT)
Age: 61
End: 2021-05-04

## 2021-05-04 ENCOUNTER — APPOINTMENT (OUTPATIENT)
Dept: OPHTHALMOLOGY | Facility: CLINIC | Age: 61
End: 2021-05-04
Payer: COMMERCIAL

## 2021-05-04 PROCEDURE — 92250 FUNDUS PHOTOGRAPHY W/I&R: CPT

## 2021-05-04 PROCEDURE — 92020 GONIOSCOPY: CPT

## 2021-05-04 PROCEDURE — 99072 ADDL SUPL MATRL&STAF TM PHE: CPT

## 2021-05-04 PROCEDURE — 92014 COMPRE OPH EXAM EST PT 1/>: CPT

## 2021-06-28 ENCOUNTER — NON-APPOINTMENT (OUTPATIENT)
Age: 61
End: 2021-06-28

## 2021-06-30 ENCOUNTER — APPOINTMENT (OUTPATIENT)
Dept: CARDIOLOGY | Facility: CLINIC | Age: 61
End: 2021-06-30
Payer: COMMERCIAL

## 2021-06-30 VITALS
DIASTOLIC BLOOD PRESSURE: 78 MMHG | HEART RATE: 74 BPM | OXYGEN SATURATION: 97 % | HEIGHT: 71 IN | SYSTOLIC BLOOD PRESSURE: 120 MMHG | BODY MASS INDEX: 28 KG/M2 | WEIGHT: 200 LBS

## 2021-06-30 PROCEDURE — 99215 OFFICE O/P EST HI 40 MIN: CPT

## 2021-06-30 PROCEDURE — 93000 ELECTROCARDIOGRAM COMPLETE: CPT

## 2021-06-30 NOTE — HISTORY OF PRESENT ILLNESS
[FreeTextEntry1] : Mr. Anjali awters a is a 61-year-old male with history of hypertension, hypercholesteremia, prediabetes, significant family history of premature coronary artery disease presented to re-establish care in setting of worsening atypical non-cardiac chest pain. Patient had a stress test several years ago and as per him was within normal limits. Patient is not very active.

## 2021-06-30 NOTE — DISCUSSION/SUMMARY
[FreeTextEntry1] : patient is a 61-year-old male with history of hypertension, hypercholesteremia, prediabetes, significant family history of premature coronary artery disease presented to follow up with symptoms of atypical chest discomfort - presently has more of the chest discomfort\par - will refer for a nuclear stress test and an echocardiogram\par - encouraged the use of baby asa\par - Recent blood work were discussed with the patient and he will have repeat done shortly with his PMD. \par - Patient was encouraged to continue with the current medical therapy for his blood pressure and hypercholesterolemia. \par - ECG with no changes from prior\par - Patient was advised to continue with aspirin 81 mg for primary prevention.\par - Patient was encouraged to exercise and maintain a heart healthy diet.\par - Encouraged patient to continue healthy exercise and eating habits, focusing on a Mediterranean style of eating and aiming for the recommended 150 minutes per week of moderate physical activity.\par - Encouraged the patient to find healthy outlets and coping mechanisms to help manage stress, such as physical activity/exercise, reducing workload if possible, spending time with family and friends, engaging in an enjoyable hobby, or using meditation or mindfulness techniques.\par \par \par

## 2021-06-30 NOTE — PHYSICAL EXAM
[Well Developed] : well developed [Well Nourished] : well nourished [No Acute Distress] : no acute distress [Normal Conjunctiva] : normal conjunctiva [Normal Venous Pressure] : normal venous pressure [No Carotid Bruit] : no carotid bruit [Normal S1, S2] : normal S1, S2 [No Rub] : no rub [No Murmur] : no murmur [No Gallop] : no gallop [Clear Lung Fields] : clear lung fields [Good Air Entry] : good air entry [No Respiratory Distress] : no respiratory distress  [Soft] : abdomen soft [Non Tender] : non-tender [No Masses/organomegaly] : no masses/organomegaly [Normal Bowel Sounds] : normal bowel sounds [Normal Gait] : normal gait [No Edema] : no edema [No Cyanosis] : no cyanosis [No Clubbing] : no clubbing [No Varicosities] : no varicosities [No Rash] : no rash [No Skin Lesions] : no skin lesions [Moves all extremities] : moves all extremities [No Focal Deficits] : no focal deficits [Normal Speech] : normal speech [Alert and Oriented] : alert and oriented [Normal memory] : normal memory

## 2021-06-30 NOTE — REASON FOR VISIT
[Follow-Up - Clinic] : a clinic follow-up of [Chest Pain] : chest pain [Symptom and Test Evaluation] : symptom and test evaluation [Spouse] : spouse

## 2021-08-31 ENCOUNTER — NON-APPOINTMENT (OUTPATIENT)
Age: 61
End: 2021-08-31

## 2021-08-31 ENCOUNTER — APPOINTMENT (OUTPATIENT)
Dept: OPHTHALMOLOGY | Facility: CLINIC | Age: 61
End: 2021-08-31
Payer: COMMERCIAL

## 2021-08-31 PROCEDURE — 92012 INTRM OPH EXAM EST PATIENT: CPT

## 2021-08-31 PROCEDURE — 92133 CPTRZD OPH DX IMG PST SGM ON: CPT

## 2021-08-31 PROCEDURE — 92083 EXTENDED VISUAL FIELD XM: CPT

## 2021-09-01 ENCOUNTER — APPOINTMENT (OUTPATIENT)
Dept: CV DIAGNOSITCS | Facility: HOSPITAL | Age: 61
End: 2021-09-01

## 2021-09-01 ENCOUNTER — OUTPATIENT (OUTPATIENT)
Dept: OUTPATIENT SERVICES | Facility: HOSPITAL | Age: 61
LOS: 1 days | End: 2021-09-01
Payer: COMMERCIAL

## 2021-09-01 ENCOUNTER — APPOINTMENT (OUTPATIENT)
Dept: CV DIAGNOSTICS | Facility: HOSPITAL | Age: 61
End: 2021-09-01

## 2021-09-01 DIAGNOSIS — Z98.890 OTHER SPECIFIED POSTPROCEDURAL STATES: Chronic | ICD-10-CM

## 2021-09-01 DIAGNOSIS — R07.89 OTHER CHEST PAIN: ICD-10-CM

## 2021-09-01 PROCEDURE — 93306 TTE W/DOPPLER COMPLETE: CPT | Mod: 26

## 2021-09-01 PROCEDURE — 76376 3D RENDER W/INTRP POSTPROCES: CPT

## 2021-09-01 PROCEDURE — 93306 TTE W/DOPPLER COMPLETE: CPT

## 2021-09-01 PROCEDURE — 76376 3D RENDER W/INTRP POSTPROCES: CPT | Mod: 26

## 2021-10-01 ENCOUNTER — APPOINTMENT (OUTPATIENT)
Dept: VASCULAR SURGERY | Facility: CLINIC | Age: 61
End: 2021-10-01
Payer: COMMERCIAL

## 2021-10-01 VITALS
HEART RATE: 66 BPM | WEIGHT: 198 LBS | BODY MASS INDEX: 28.35 KG/M2 | SYSTOLIC BLOOD PRESSURE: 120 MMHG | HEIGHT: 70 IN | TEMPERATURE: 98.7 F | DIASTOLIC BLOOD PRESSURE: 81 MMHG

## 2021-10-01 DIAGNOSIS — I83.893 VARICOSE VEINS OF BILATERAL LOWER EXTREMITIES WITH OTHER COMPLICATIONS: ICD-10-CM

## 2021-10-01 PROCEDURE — 93970 EXTREMITY STUDY: CPT

## 2021-10-01 PROCEDURE — 99204 OFFICE O/P NEW MOD 45 MIN: CPT

## 2021-10-01 NOTE — ASSESSMENT
[FreeTextEntry1] : mild sx, minimal impact on ADLs.\par rec continued compression\par if sx worsen would rec gsv ablation, + stab phleb

## 2021-10-01 NOTE — PHYSICAL EXAM
[JVD] : no jugular venous distention  [Normal Breath Sounds] : Normal breath sounds [Normal Heart Sounds] : normal heart sounds [2+] : left 2+ [de-identified] : awake, alert [FreeTextEntry1] : feet warm well perfused.  + large VV cluster L>R.  trace edema, no sig hyperpigmentation or skin thickening

## 2021-10-01 NOTE — HISTORY OF PRESENT ILLNESS
[FreeTextEntry1] : 61 M, w bilat VV, L?R.  Pt states he has had enlarging VV over the last few years, most notable the last 3 months. \par Overall symptoms are mild, he notices some fatigue after long walks, approx 3 mi.  also mild swelling.  VV can occasionally itch but usually not painful does not require OTC.  \par no personal or fam hx DVT\par + fam hx VV\par no smoking\par  He has been wearing compression.

## 2021-10-11 ENCOUNTER — APPOINTMENT (OUTPATIENT)
Dept: INTERNAL MEDICINE | Facility: CLINIC | Age: 61
End: 2021-10-11
Payer: COMMERCIAL

## 2021-10-11 VITALS
RESPIRATION RATE: 16 BRPM | OXYGEN SATURATION: 98 % | TEMPERATURE: 95.3 F | DIASTOLIC BLOOD PRESSURE: 80 MMHG | SYSTOLIC BLOOD PRESSURE: 125 MMHG | HEIGHT: 70 IN | WEIGHT: 198 LBS | BODY MASS INDEX: 28.35 KG/M2 | HEART RATE: 68 BPM

## 2021-10-11 PROCEDURE — G0008: CPT

## 2021-10-11 PROCEDURE — 99213 OFFICE O/P EST LOW 20 MIN: CPT | Mod: 25

## 2021-10-11 PROCEDURE — 90686 IIV4 VACC NO PRSV 0.5 ML IM: CPT

## 2021-10-11 NOTE — REVIEW OF SYSTEMS
[FreeTextEntry9] : Resolving left sided, lower rib cage pain, following a mechanical pressure injury, few days ago.

## 2021-10-11 NOTE — HEALTH RISK ASSESSMENT
[No] : In the past 12 months have you used drugs other than those required for medical reasons? No [No falls in past year] : Patient reported no falls in the past year [0] : 2) Feeling down, depressed, or hopeless: Not at all (0) [] : No [de-identified] : CARD/VASC SX [[id number]] : 0

## 2021-10-11 NOTE — HISTORY OF PRESENT ILLNESS
[de-identified] : 61 year old  male patient with history of stable Essential Hypertension, Hypercholesterolemia, Elevated Hemoglobin A1c, Vitamin D Deficiency, BPH without Urinary Obstruction, history as stated, presented for follow up examination. Patient is compliant with all medications. Denies shortness of breath, chest pain or abdominal pains at this time. ROS as stated.\par

## 2021-10-11 NOTE — ASSESSMENT
[FreeTextEntry1] : 61 year old male found to have stable Essential Hypertension, Hypercholesterolemia, Elevated Hemoglobin A1c, Vitamin D Deficiency, BPH without Urinary Obstruction,with the current prescription regimen as recommended, diet and life style modifications, as counseled. Prior results reviewed, interpreted and discussed with the patient during today's examination, as appropriate. Follow up, treatment plan and tests, as ordered.\par \par Total time spent : 25 minutes\par Including:\par Preparation prior to visit - Reviewing prior record, results of tests and Consultation Reports as applicable\par Conducting an appropriate H & P during today's encounter\par Appropriate orders for tests, medications and procedures, as applicable\par Counseling patient \par Note completion\par

## 2021-10-12 LAB
ALBUMIN SERPL ELPH-MCNC: 4.4 G/DL
ALP BLD-CCNC: 86 U/L
ALT SERPL-CCNC: 13 U/L
ANION GAP SERPL CALC-SCNC: 12 MMOL/L
AST SERPL-CCNC: 14 U/L
BASOPHILS # BLD AUTO: 0.06 K/UL
BASOPHILS NFR BLD AUTO: 0.8 %
BILIRUB SERPL-MCNC: 0.5 MG/DL
BUN SERPL-MCNC: 7 MG/DL
CALCIUM SERPL-MCNC: 9.6 MG/DL
CHLORIDE SERPL-SCNC: 101 MMOL/L
CHOLEST SERPL-MCNC: 150 MG/DL
CO2 SERPL-SCNC: 27 MMOL/L
CREAT SERPL-MCNC: 0.88 MG/DL
EOSINOPHIL # BLD AUTO: 0.23 K/UL
EOSINOPHIL NFR BLD AUTO: 3.1 %
ESTIMATED AVERAGE GLUCOSE: 120 MG/DL
GGT SERPL-CCNC: 13 U/L
GLUCOSE SERPL-MCNC: 74 MG/DL
HBA1C MFR BLD HPLC: 5.8 %
HCT VFR BLD CALC: 44.1 %
HDLC SERPL-MCNC: 40 MG/DL
HGB BLD-MCNC: 15 G/DL
IMM GRANULOCYTES NFR BLD AUTO: 0.3 %
LDLC SERPL CALC-MCNC: 95 MG/DL
LYMPHOCYTES # BLD AUTO: 3.91 K/UL
LYMPHOCYTES NFR BLD AUTO: 53.3 %
MAN DIFF?: NORMAL
MCHC RBC-ENTMCNC: 30.7 PG
MCHC RBC-ENTMCNC: 34 GM/DL
MCV RBC AUTO: 90.2 FL
MONOCYTES # BLD AUTO: 0.41 K/UL
MONOCYTES NFR BLD AUTO: 5.6 %
NEUTROPHILS # BLD AUTO: 2.7 K/UL
NEUTROPHILS NFR BLD AUTO: 36.9 %
NONHDLC SERPL-MCNC: 110 MG/DL
PLATELET # BLD AUTO: 190 K/UL
POTASSIUM SERPL-SCNC: 4.5 MMOL/L
PROT SERPL-MCNC: 7 G/DL
PSA FREE FLD-MCNC: 14 %
PSA FREE SERPL-MCNC: 0.8 NG/ML
PSA SERPL-MCNC: 5.69 NG/ML
RBC # BLD: 4.89 M/UL
RBC # FLD: 13.2 %
SODIUM SERPL-SCNC: 140 MMOL/L
TRIGL SERPL-MCNC: 72 MG/DL
WBC # FLD AUTO: 7.33 K/UL

## 2021-10-19 ENCOUNTER — TRANSCRIPTION ENCOUNTER (OUTPATIENT)
Age: 61
End: 2021-10-19

## 2021-11-11 ENCOUNTER — TRANSCRIPTION ENCOUNTER (OUTPATIENT)
Age: 61
End: 2021-11-11

## 2021-12-03 ENCOUNTER — TRANSCRIPTION ENCOUNTER (OUTPATIENT)
Age: 61
End: 2021-12-03

## 2021-12-11 NOTE — PHYSICAL THERAPY INITIAL EVALUATION ADULT - STANDING BALANCE: DYNAMIC, REHAB EVAL
Pt fell backwards on marble stairs. Denies LOC/vomiting. + laceration to occipital/temporal area. Pt A&Ox4. c axillary crutches/fair plus

## 2022-01-28 ENCOUNTER — TRANSCRIPTION ENCOUNTER (OUTPATIENT)
Age: 62
End: 2022-01-28

## 2022-02-28 ENCOUNTER — APPOINTMENT (OUTPATIENT)
Dept: ORTHOPEDIC SURGERY | Facility: CLINIC | Age: 62
End: 2022-02-28
Payer: COMMERCIAL

## 2022-02-28 VITALS — WEIGHT: 199 LBS | HEIGHT: 70 IN | BODY MASS INDEX: 28.49 KG/M2

## 2022-02-28 DIAGNOSIS — M77.12 LATERAL EPICONDYLITIS, LEFT ELBOW: ICD-10-CM

## 2022-02-28 DIAGNOSIS — M18.0 BILATERAL PRIMARY OSTEOARTHRITIS OF FIRST CARPOMETACARPAL JOINTS: ICD-10-CM

## 2022-02-28 PROCEDURE — 73130 X-RAY EXAM OF HAND: CPT | Mod: RT

## 2022-02-28 PROCEDURE — 99214 OFFICE O/P EST MOD 30 MIN: CPT

## 2022-02-28 NOTE — PHYSICAL EXAM
[de-identified] : Right Upper Extremity\par o Elbow :\par ¦ Inspection/Palpation : no tenderness, no swelling, no deformities\par ¦ Range of Motion : full and painless in all planes, no crepitus\par ¦ Strength : flexion and extension 5/5\par ¦ Stability : no joint instability on provocative testing\par o Muscle Bulk : no atrophy\par o Sensation : sensation intact to light touch\par o Skin : no skin lesions, no discoloration\par o Vascular Exam : no edema, no cyanosis, radial and ulnar pulses normal \par \par o Hand :\par ¦ Inspection/Palpation : basilar thumb joint tenderness to palpation or pain with axial compression\par ¦ Range of Motion : full arc of motion in the small joints of the hand, no discomfort elicited\par ¦ Strength : all intrinsic and extrinsic hand muscles 5/5\par ¦ Stability : no joint instability on provocative testing\par \par \par Left Upper Extremity\par o Elbow :\par ¦ Inspection/Palpation : lateral epicondyle tenderness to palpation, no swelling, no deformities\par ¦ Range of Motion : full and painless in all planes, no crepitus\par ¦ Strength : flexion and extension 5/5\par ¦ Stability : no joint instability on provocative testing\par o Muscle Bulk : no atrophy\par o Sensation : sensation intact to light touch\par o Skin : no skin lesions, no discoloration\par o Vascular Exam : no edema, no cyanosis, radial and ulnar pulses normal \par o Special Tests: Pain with resisted wrist extension,  no pain with resisted wrist flexion or pronation. \par \par o Hand :\par ¦ Inspection/Palpation : basilar thumb joint tenderness to palpation or pain with axial compression\par ¦ Range of Motion : full arc of motion in the small joints of the hand, no discomfort elicited\par ¦ Strength : all intrinsic and extrinsic hand muscles 5/5\par ¦ Stability : no joint instability on provocative testing\par  [de-identified] : o RIGHT Hand: AP, lateral and oblique views of the hand were obtained, there are no soft tissue abnormalities, no fractures, alignment is normal, basilar joint degenerative changes, normal bone density, no bony lesions.\par \par o LEFT Hand: AP, lateral and oblique views of the hand were obtained, there are no soft tissue abnormalities, no fractures, alignment is normal, basilar joint degenerative changes, normal bone density, no bony lesions.\par \par

## 2022-02-28 NOTE — DISCUSSION/SUMMARY
[de-identified] : The underlying pathophysiology was reviewed in great detail with the patient as well as the various treatment options, including ice, analgesics, NSAIDs, Physical therapy, steroid injections, PRP, \par \par Activity modifications and restrictions were discussed. I recommend that he  avoid heavy gripping/squeezing. \par \par I have recommended utilizing a counterforce brace or an elbow sleeve to provide added support, stability and pain relief.\par \par Recommended use of OTC Voltaren gel \par \par A home exercise sheet was given and discussed with the patient to follow. \par \par A prescription for Physical Therapy was provided.\par \par FU 6 weeks \par \par All questions were answered, all alternatives discussed and the patient is in complete agreement with that plan. Follow-up appointment as instructed. Any issues and the patient will call or come in sooner.

## 2022-02-28 NOTE — HISTORY OF PRESENT ILLNESS
[de-identified] : CHRISTIAN HILARIO  is a 62 year old male  presenting to the office complaining of left elbow pain. Patient reports pain since January 2022. Patient denies injury or trauma to the area. The patient describes the pain as a dull aching, and occasionally sharp pain localized to the lateral  aspect of his  left elbow that is intermittent in nature. His  symptoms are exacerbated with any movement of the elbow, and gripping of the hand. Patient reports the pain is not waking him  up at night.  Patient reports associated weakness. Denies numbness and tingling in the upper extremity. \par Patient is taking NSAIDs/ Diclofenac gel/Counterforce brace for pain relief with mild to moderate  relief in symptoms. He is also complaining of bilateral thumb pain.

## 2022-04-07 ENCOUNTER — RX RENEWAL (OUTPATIENT)
Age: 62
End: 2022-04-07

## 2022-04-11 PROBLEM — Z11.59 SCREENING FOR VIRAL DISEASE: Status: RESOLVED | Noted: 2020-05-27 | Resolved: 2022-04-11

## 2022-05-26 ENCOUNTER — APPOINTMENT (OUTPATIENT)
Dept: INTERNAL MEDICINE | Facility: CLINIC | Age: 62
End: 2022-05-26
Payer: COMMERCIAL

## 2022-05-26 VITALS
HEIGHT: 70 IN | SYSTOLIC BLOOD PRESSURE: 112 MMHG | BODY MASS INDEX: 29.2 KG/M2 | WEIGHT: 204 LBS | TEMPERATURE: 98 F | HEART RATE: 83 BPM | DIASTOLIC BLOOD PRESSURE: 71 MMHG | OXYGEN SATURATION: 95 % | RESPIRATION RATE: 16 BRPM

## 2022-05-26 PROCEDURE — 99213 OFFICE O/P EST LOW 20 MIN: CPT

## 2022-05-26 NOTE — HISTORY OF PRESENT ILLNESS
[de-identified] : 62 year old  male patient with history of stable Essential Hypertension, Hypercholesterolemia, Elevated Hemoglobin A1c, Vitamin D Deficiency, BPH without Urinary Obstruction, history as stated, presented for follow up examination. Patient is compliant with all medications. Denies shortness of breath, chest pain or abdominal pains at this time. ROS as stated.\par

## 2022-05-26 NOTE — ASSESSMENT
[FreeTextEntry1] : 62 year old male found to have stable Essential Hypertension, Hypercholesterolemia, Elevated Hemoglobin A1c, Vitamin D Deficiency, BPH without Urinary Obstruction,with the current prescription regimen as recommended, diet and life style modifications, as counseled. Prior results reviewed, interpreted and discussed with the patient during today's examination, as appropriate. Follow up, treatment plan and tests, as ordered.\par \par Total time spent : 25 minutes\par Including:\par Preparation prior to visit - Reviewing prior record, results of tests and Consultation Reports as applicable\par Conducting an appropriate H & P during today's encounter\par Appropriate orders for tests, medications and procedures, as applicable\par Counseling patient \par Note completion\par

## 2022-05-26 NOTE — HEALTH RISK ASSESSMENT
[Never] : Never [No] : In the past 12 months have you used drugs other than those required for medical reasons? No [No falls in past year] : Patient reported no falls in the past year [0] : 2) Feeling down, depressed, or hopeless: Not at all (0) [de-identified] : ORTHO [[id number]] : 0

## 2022-06-03 ENCOUNTER — LABORATORY RESULT (OUTPATIENT)
Age: 62
End: 2022-06-03

## 2022-06-13 ENCOUNTER — NON-APPOINTMENT (OUTPATIENT)
Age: 62
End: 2022-06-13

## 2022-06-13 ENCOUNTER — APPOINTMENT (OUTPATIENT)
Dept: OPHTHALMOLOGY | Facility: CLINIC | Age: 62
End: 2022-06-13
Payer: COMMERCIAL

## 2022-06-13 PROCEDURE — 92250 FUNDUS PHOTOGRAPHY W/I&R: CPT

## 2022-06-13 PROCEDURE — 92015 DETERMINE REFRACTIVE STATE: CPT

## 2022-06-13 PROCEDURE — 92014 COMPRE OPH EXAM EST PT 1/>: CPT

## 2022-07-06 ENCOUNTER — RX RENEWAL (OUTPATIENT)
Age: 62
End: 2022-07-06

## 2022-08-22 NOTE — H&P PST ADULT - IS PATIENT PREGNANT?
not applicable (Male) Dutasteride Pregnancy And Lactation Text: This medication is absolutely contraindicated in women, especially during pregnancy and breast feeding. Feminization of male fetuses is possible if taking while pregnant.

## 2022-09-10 ENCOUNTER — RX RENEWAL (OUTPATIENT)
Age: 62
End: 2022-09-10

## 2022-09-19 NOTE — H&P PST ADULT - RESPIRATORY AND THORAX
Muscle Hinge Flap Text: The defect edges were debeveled with a #15 scalpel blade.  Given the size, depth and location of the defect and the proximity to free margins a muscle hinge flap was deemed most appropriate.  Using a sterile surgical marker, an appropriate hinge flap was drawn incorporating the defect. The area thus outlined was incised with a #15 scalpel blade.  The skin margins were undermined to an appropriate distance in all directions utilizing iris scissors. negative

## 2022-11-03 ENCOUNTER — NON-APPOINTMENT (OUTPATIENT)
Age: 62
End: 2022-11-03

## 2022-11-03 ENCOUNTER — APPOINTMENT (OUTPATIENT)
Dept: OPHTHALMOLOGY | Facility: CLINIC | Age: 62
End: 2022-11-03
Payer: COMMERCIAL

## 2022-11-03 PROCEDURE — 92133 CPTRZD OPH DX IMG PST SGM ON: CPT

## 2022-11-03 PROCEDURE — 92012 INTRM OPH EXAM EST PATIENT: CPT

## 2022-11-03 PROCEDURE — 92083 EXTENDED VISUAL FIELD XM: CPT

## 2022-12-19 ENCOUNTER — APPOINTMENT (OUTPATIENT)
Dept: OPHTHALMOLOGY | Facility: CLINIC | Age: 62
End: 2022-12-19
Payer: COMMERCIAL

## 2022-12-19 ENCOUNTER — NON-APPOINTMENT (OUTPATIENT)
Age: 62
End: 2022-12-19

## 2022-12-19 PROCEDURE — 92012 INTRM OPH EXAM EST PATIENT: CPT

## 2022-12-31 LAB
ALBUMIN SERPL ELPH-MCNC: 4.3 G/DL
ALP BLD-CCNC: 92 U/L
ALT SERPL-CCNC: 17 U/L
ANION GAP SERPL CALC-SCNC: 10 MMOL/L
APPEARANCE: CLEAR
AST SERPL-CCNC: 16 U/L
BACTERIA: NEGATIVE
BASOPHILS # BLD AUTO: 0.07 K/UL
BASOPHILS NFR BLD AUTO: 1 %
BILIRUB SERPL-MCNC: 0.4 MG/DL
BILIRUBIN URINE: NEGATIVE
BLOOD URINE: NEGATIVE
BUN SERPL-MCNC: 8 MG/DL
CALCIUM SERPL-MCNC: 9.1 MG/DL
CHLORIDE SERPL-SCNC: 102 MMOL/L
CHOLEST SERPL-MCNC: 173 MG/DL
CO2 SERPL-SCNC: 27 MMOL/L
COLOR: NORMAL
CREAT SERPL-MCNC: 0.98 MG/DL
CREAT SPEC-SCNC: 132 MG/DL
EGFR: 86 ML/MIN/1.73M2
EOSINOPHIL # BLD AUTO: 0.25 K/UL
EOSINOPHIL NFR BLD AUTO: 3.5 %
ESTIMATED AVERAGE GLUCOSE: 126 MG/DL
GGT SERPL-CCNC: 15 U/L
GLUCOSE QUALITATIVE U: NEGATIVE
GLUCOSE SERPL-MCNC: 105 MG/DL
HBA1C MFR BLD HPLC: 6 %
HCT VFR BLD CALC: 44.1 %
HDLC SERPL-MCNC: 44 MG/DL
HGB BLD-MCNC: 14.2 G/DL
HYALINE CASTS: 0 /LPF
IMM GRANULOCYTES NFR BLD AUTO: 0.3 %
KETONES URINE: NEGATIVE
LDLC SERPL CALC-MCNC: 111 MG/DL
LEUKOCYTE ESTERASE URINE: NEGATIVE
LYMPHOCYTES # BLD AUTO: 3.5 K/UL
LYMPHOCYTES NFR BLD AUTO: 49.6 %
MAN DIFF?: NORMAL
MCHC RBC-ENTMCNC: 30.1 PG
MCHC RBC-ENTMCNC: 32.2 GM/DL
MCV RBC AUTO: 93.4 FL
MICROALBUMIN 24H UR DL<=1MG/L-MCNC: <1.2 MG/DL
MICROALBUMIN/CREAT 24H UR-RTO: NORMAL MG/G
MICROSCOPIC-UA: NORMAL
MONOCYTES # BLD AUTO: 0.51 K/UL
MONOCYTES NFR BLD AUTO: 7.2 %
NEUTROPHILS # BLD AUTO: 2.7 K/UL
NEUTROPHILS NFR BLD AUTO: 38.4 %
NITRITE URINE: NEGATIVE
NONHDLC SERPL-MCNC: 129 MG/DL
PH URINE: 8
PLATELET # BLD AUTO: 222 K/UL
POTASSIUM SERPL-SCNC: 4.7 MMOL/L
PROT SERPL-MCNC: 7 G/DL
PROTEIN URINE: NORMAL
PSA FREE FLD-MCNC: 20 %
PSA FREE SERPL-MCNC: 1.07 NG/ML
PSA SERPL-MCNC: 5.33 NG/ML
RBC # BLD: 4.72 M/UL
RBC # FLD: 13.4 %
RED BLOOD CELLS URINE: 2 /HPF
SODIUM SERPL-SCNC: 139 MMOL/L
SPECIFIC GRAVITY URINE: 1.01
SQUAMOUS EPITHELIAL CELLS: 0 /HPF
T3 SERPL-MCNC: 151 NG/DL
T4 FREE SERPL-MCNC: 1.1 NG/DL
TRIGL SERPL-MCNC: 93 MG/DL
TSH SERPL-ACNC: 2.07 UIU/ML
UROBILINOGEN URINE: NORMAL
WBC # FLD AUTO: 7.05 K/UL
WHITE BLOOD CELLS URINE: 0 /HPF

## 2023-01-03 ENCOUNTER — TRANSCRIPTION ENCOUNTER (OUTPATIENT)
Age: 63
End: 2023-01-03

## 2023-01-30 ENCOUNTER — APPOINTMENT (OUTPATIENT)
Dept: UROLOGY | Facility: CLINIC | Age: 63
End: 2023-01-30

## 2023-03-16 ENCOUNTER — APPOINTMENT (OUTPATIENT)
Dept: UROLOGY | Facility: CLINIC | Age: 63
End: 2023-03-16
Payer: MEDICARE

## 2023-03-16 VITALS
WEIGHT: 200 LBS | HEART RATE: 74 BPM | HEIGHT: 70 IN | SYSTOLIC BLOOD PRESSURE: 111 MMHG | DIASTOLIC BLOOD PRESSURE: 66 MMHG | TEMPERATURE: 98.2 F | BODY MASS INDEX: 28.63 KG/M2 | RESPIRATION RATE: 17 BRPM

## 2023-03-16 DIAGNOSIS — Z87.438 PERSONAL HISTORY OF OTHER DISEASES OF MALE GENITAL ORGANS: ICD-10-CM

## 2023-03-16 PROCEDURE — 99204 OFFICE O/P NEW MOD 45 MIN: CPT

## 2023-03-16 PROCEDURE — 51798 US URINE CAPACITY MEASURE: CPT

## 2023-03-16 NOTE — ASSESSMENT
[FreeTextEntry1] : 63 year old male patient with BPH and elevated PSA \par \par Assessment / Plan:\par \par We had a long discussion with the patient about his condition. We discussed that at this point is prudent to a prostate MRI. Will put an order for MRI for approval.\par WE discussed with the patient if he is bothered by his symptoms he can increase the dose of Flomax to 0.8 mg at bedtime. Patient was educated that about possible side effects of lightheadedness.\par

## 2023-03-16 NOTE — END OF VISIT
[] : Fellow [Fellow] : Fellow [Time Spent: ___ minutes] : I have spent [unfilled] minutes of time on the encounter. [FreeTextEntry3] : elevated PSA\par counseled pt \par reviewed prior u/s \par \par will obtain auth for prostate MRI\par counseled pt on expected results\par \par ua today \par \par The total time spent with the patient includes face to face time as well as time for documentation, ordering medications/labs/procedures, and care coordination, but I acknowledge it does not include time spent on any procedures performed (eg PVR, UDS, Cystoscopy, catheter changes, etc).  Time includes reviewing the chart prior to visit, documentation, and correspondence.\par  [FreeTextEntry2] : The patient voided with instructions to empty their bladder. Afterwards, we performed a bladder ultrasound scan to obtain a post-void residual.  The estimated residual volume on the bladder scan was:  6 cc \par

## 2023-03-16 NOTE — PHYSICAL EXAM
[General Appearance - Well Developed] : well developed [General Appearance - Well Nourished] : well nourished [Normal Appearance] : normal appearance [General Appearance - In No Acute Distress] : no acute distress [Edema] : no peripheral edema [Skin Color & Pigmentation] : normal skin color and pigmentation [Skin Turgor] : supple [] : no rash [Skin Lesions] : no skin lesions [Mood] : the mood was normal [Not Anxious] : not anxious

## 2023-03-16 NOTE — HISTORY OF PRESENT ILLNESS
[FreeTextEntry1] : 63 year old male patient presenting today for evaluation of the prostate, PSA, and BPH.\par Patient was referred by his relative Dr Grayson Faria ( urologist), patient's wife is also our patient Jimbo Alba.( will have sacrocolpopexy in June).\par Patient reports good stream, don't have to push or strain to urinate, no hesitancy.\par Patient has been taking Flomax 0.4  for the past 5 year.\par Dr Beau Levy is following the patient for his PSA ( last PSA is 5.33) in Dec 2022. PSA has been fluctuating between 5.3 and 5.7. ( he is doing his PSA every 6 months).\par No MRI done for the prostate yet.\par recent CT scan showed 5x5 prostate \par \par \par DFT: q 3 hrs \par nocturia: 1 time\par urgency: --\par \par No constipation \par drinks 64 oz of water a day\par \par PMH: HNT, DL\par PSH: ankle surgery.\par Social: nonsmoker, no alcohol, works as an .\par \par PVR: 6

## 2023-03-17 LAB
APPEARANCE: CLEAR
BILIRUBIN URINE: NEGATIVE
BLOOD URINE: NEGATIVE
COLOR: NORMAL
GLUCOSE QUALITATIVE U: NEGATIVE
KETONES URINE: NEGATIVE
LEUKOCYTE ESTERASE URINE: NEGATIVE
NITRITE URINE: NEGATIVE
PH URINE: 6.5
PROTEIN URINE: NEGATIVE
SPECIFIC GRAVITY URINE: 1.01
UROBILINOGEN URINE: NORMAL

## 2023-05-09 ENCOUNTER — RESULT REVIEW (OUTPATIENT)
Age: 63
End: 2023-05-09

## 2023-05-14 ENCOUNTER — APPOINTMENT (OUTPATIENT)
Dept: MRI IMAGING | Facility: IMAGING CENTER | Age: 63
End: 2023-05-14
Payer: MEDICARE

## 2023-05-14 ENCOUNTER — OUTPATIENT (OUTPATIENT)
Dept: OUTPATIENT SERVICES | Facility: HOSPITAL | Age: 63
LOS: 1 days | End: 2023-05-14
Payer: MEDICARE

## 2023-05-14 ENCOUNTER — RESULT REVIEW (OUTPATIENT)
Age: 63
End: 2023-05-14

## 2023-05-14 DIAGNOSIS — Z98.890 OTHER SPECIFIED POSTPROCEDURAL STATES: Chronic | ICD-10-CM

## 2023-05-14 DIAGNOSIS — R97.20 ELEVATED PROSTATE SPECIFIC ANTIGEN [PSA]: ICD-10-CM

## 2023-05-14 PROCEDURE — A9585: CPT

## 2023-05-14 PROCEDURE — 72197 MRI PELVIS W/O & W/DYE: CPT

## 2023-05-14 PROCEDURE — 76498 UNLISTED MR PROCEDURE: CPT

## 2023-05-14 PROCEDURE — 76498P: CUSTOM | Mod: 26

## 2023-05-14 PROCEDURE — 72197 MRI PELVIS W/O & W/DYE: CPT | Mod: 26

## 2023-05-15 ENCOUNTER — APPOINTMENT (OUTPATIENT)
Dept: OPHTHALMOLOGY | Facility: CLINIC | Age: 63
End: 2023-05-15
Payer: MEDICARE

## 2023-05-15 ENCOUNTER — NON-APPOINTMENT (OUTPATIENT)
Age: 63
End: 2023-05-15

## 2023-05-15 PROCEDURE — 92014 COMPRE OPH EXAM EST PT 1/>: CPT

## 2023-05-15 PROCEDURE — 92202 OPSCPY EXTND ON/MAC DRAW: CPT

## 2023-05-15 NOTE — ASU PREOP CHECKLIST - SITE MARKED BY ANESTHESIOLOGIST
[School Readiness] : school readiness [Mental Health] : mental health [Nutrition and Physical Activity] : nutrition and physical activity [Oral Health] : oral health [Safety] : safety [Father] : father [] : The components of the vaccine(s) to be administered today are listed in the plan of care. The disease(s) for which the vaccine(s) are intended to prevent and the risks have been discussed with the caretaker.  The risks are also included in the appropriate vaccination information statements which have been provided to the patient's caregiver.  The caregiver has given consent to vaccinate. [FreeTextEntry1] : \par 3 year male growing and developing well.\par \par Continue balanced diet with all food groups. Brush teeth twice a day with toothbrush. Recommend visit to dentist. As per car seat 's guidelines, use foward-facing booster seat until child reaches highest weight/height for seat. Child needs to ride in a belt-positioning booster seat until  4 feet 9 inches has been reached and are between 8 and 12 years of age. Put child to sleep in own bed. Help child to maintain consistent daily routines and sleep schedule.  discussed. Ensure home is safe. Teach child about personal safety. Use consistent, positive discipline. Read aloud to child. Limit screen time to no more than 2 hours per day.\par Return 1 year for routine well child check.\par Counseling provided on vaccines given today.\par  n/a

## 2023-05-16 ENCOUNTER — NON-APPOINTMENT (OUTPATIENT)
Age: 63
End: 2023-05-16

## 2023-05-16 ENCOUNTER — APPOINTMENT (OUTPATIENT)
Dept: INTERNAL MEDICINE | Facility: CLINIC | Age: 63
End: 2023-05-16
Payer: MEDICARE

## 2023-05-16 VITALS
OXYGEN SATURATION: 97 % | BODY MASS INDEX: 29.35 KG/M2 | RESPIRATION RATE: 18 BRPM | TEMPERATURE: 98 F | DIASTOLIC BLOOD PRESSURE: 68 MMHG | HEART RATE: 75 BPM | HEIGHT: 70 IN | WEIGHT: 205 LBS | SYSTOLIC BLOOD PRESSURE: 98 MMHG

## 2023-05-16 PROCEDURE — 36415 COLL VENOUS BLD VENIPUNCTURE: CPT

## 2023-05-16 PROCEDURE — 93000 ELECTROCARDIOGRAM COMPLETE: CPT | Mod: 59

## 2023-05-16 PROCEDURE — G0439: CPT

## 2023-05-16 NOTE — ASSESSMENT
[FreeTextEntry1] : 63 year old male found to have stable Essential Hypertension, Hypercholesterolemia, Elevated Hemoglobin A1c, Vitamin D Deficiency, BPH without Urinary Obstruction,with the current prescription regimen as recommended, diet and life style modifications, as counseled. Prior results reviewed, interpreted and discussed with the patient during today's examination, as appropriate. Follow up, treatment plan and tests, as ordered.\par \par EKG:\par Sinus Rhythm @ 71 BPM.\par Known LAD, no new ST-T changes noted.

## 2023-05-16 NOTE — HEALTH RISK ASSESSMENT
[Good] : ~his/her~  mood as  good [No] : In the past 12 months have you used drugs other than those required for medical reasons? No [No falls in past year] : Patient reported no falls in the past year [0] : 2) Feeling down, depressed, or hopeless: Not at all (0) [Patient reported colonoscopy was normal] : Patient reported colonoscopy was normal [HIV test declined] : HIV test declined [None] : None [Feels Safe at Home] : Feels safe at home [Fully functional (bathing, dressing, toileting, transferring, walking, feeding)] : Fully functional (bathing, dressing, toileting, transferring, walking, feeding) [Fully functional (using the telephone, shopping, preparing meals, housekeeping, doing laundry, using] : Fully functional and needs no help or supervision to perform IADLs (using the telephone, shopping, preparing meals, housekeeping, doing laundry, using transportation, managing medications and managing finances) [Smoke Detector] : smoke detector [Carbon Monoxide Detector] : carbon monoxide detector [Seat Belt] :  uses seat belt [Sunscreen] : uses sunscreen [With Patient/Caregiver] : , with patient/caregiver [Never] : Never [FreeTextEntry1] : Check up\par  [de-identified] : URO/OPHTH [[id number]] : 0 [Change in mental status noted] : No change in mental status noted [Reports changes in hearing] : Reports no changes in hearing [Reports changes in vision] : Reports no changes in vision [Reports changes in dental health] : Reports no changes in dental health [ColonoscopyDate] : 10/20 [HepatitisCDate] : 02/20 [HepatitisCComments] : Negative [AdvancecareDate] : 05/23

## 2023-05-16 NOTE — HISTORY OF PRESENT ILLNESS
[de-identified] : 63 year old male patient with history of stable Essential Hypertension, Hypercholesterolemia, Elevated Hemoglobin A1c, Vitamin D Deficiency, BPH without Urinary Obstruction, history as stated, presented for an annual preventative examination.\par

## 2023-05-17 LAB
25(OH)D3 SERPL-MCNC: 52.9 NG/ML
ALBUMIN SERPL ELPH-MCNC: 4.4 G/DL
ALP BLD-CCNC: 89 U/L
ALT SERPL-CCNC: 15 U/L
ANION GAP SERPL CALC-SCNC: 11 MMOL/L
APPEARANCE: CLEAR
AST SERPL-CCNC: 15 U/L
BACTERIA: NEGATIVE /HPF
BASOPHILS # BLD AUTO: 0.05 K/UL
BASOPHILS NFR BLD AUTO: 0.9 %
BILIRUB SERPL-MCNC: 0.5 MG/DL
BILIRUBIN URINE: ABNORMAL
BLOOD URINE: NEGATIVE
BUN SERPL-MCNC: 7 MG/DL
CALCIUM SERPL-MCNC: 9.5 MG/DL
CAST: 2 /LPF
CHLORIDE SERPL-SCNC: 104 MMOL/L
CHOLEST SERPL-MCNC: 159 MG/DL
CO2 SERPL-SCNC: 26 MMOL/L
COLOR: NORMAL
CREAT SERPL-MCNC: 0.94 MG/DL
CREAT SPEC-SCNC: 324 MG/DL
EGFR: 90 ML/MIN/1.73M2
EOSINOPHIL # BLD AUTO: 0.1 K/UL
EOSINOPHIL NFR BLD AUTO: 1.9 %
EPITHELIAL CELLS: 1 /HPF
ESTIMATED AVERAGE GLUCOSE: 123 MG/DL
GGT SERPL-CCNC: 14 U/L
GLUCOSE QUALITATIVE U: NEGATIVE MG/DL
GLUCOSE SERPL-MCNC: 98 MG/DL
HBA1C MFR BLD HPLC: 5.9 %
HCT VFR BLD CALC: 44.3 %
HDLC SERPL-MCNC: 41 MG/DL
HGB BLD-MCNC: 14.4 G/DL
IMM GRANULOCYTES NFR BLD AUTO: 0.2 %
KETONES URINE: ABNORMAL MG/DL
LDLC SERPL CALC-MCNC: 95 MG/DL
LEUKOCYTE ESTERASE URINE: ABNORMAL
LYMPHOCYTES # BLD AUTO: 2.5 K/UL
LYMPHOCYTES NFR BLD AUTO: 47.4 %
MAN DIFF?: NORMAL
MCHC RBC-ENTMCNC: 30.1 PG
MCHC RBC-ENTMCNC: 32.5 GM/DL
MCV RBC AUTO: 92.7 FL
MICROALBUMIN 24H UR DL<=1MG/L-MCNC: 2.2 MG/DL
MICROALBUMIN/CREAT 24H UR-RTO: 7 MG/G
MICROSCOPIC-UA: NORMAL
MONOCYTES # BLD AUTO: 0.35 K/UL
MONOCYTES NFR BLD AUTO: 6.6 %
NEUTROPHILS # BLD AUTO: 2.26 K/UL
NEUTROPHILS NFR BLD AUTO: 43 %
NITRITE URINE: NEGATIVE
NONHDLC SERPL-MCNC: 119 MG/DL
PH URINE: 7
PLATELET # BLD AUTO: 202 K/UL
POTASSIUM SERPL-SCNC: 4.4 MMOL/L
PROT SERPL-MCNC: 7.1 G/DL
PROTEIN URINE: 30 MG/DL
PSA FREE FLD-MCNC: 20 %
PSA FREE SERPL-MCNC: 0.93 NG/ML
PSA SERPL-MCNC: 4.64 NG/ML
RBC # BLD: 4.78 M/UL
RBC # FLD: 13.6 %
RED BLOOD CELLS URINE: 3 /HPF
SODIUM SERPL-SCNC: 141 MMOL/L
SPECIFIC GRAVITY URINE: 1.02
TRIGL SERPL-MCNC: 121 MG/DL
TSH SERPL-ACNC: 1.51 UIU/ML
UROBILINOGEN URINE: 1 MG/DL
WBC # FLD AUTO: 5.27 K/UL
WHITE BLOOD CELLS URINE: 1 /HPF

## 2023-08-02 ENCOUNTER — TRANSCRIPTION ENCOUNTER (OUTPATIENT)
Age: 63
End: 2023-08-02

## 2023-09-12 ENCOUNTER — NON-APPOINTMENT (OUTPATIENT)
Age: 63
End: 2023-09-12

## 2023-09-12 ENCOUNTER — APPOINTMENT (OUTPATIENT)
Dept: OPHTHALMOLOGY | Facility: CLINIC | Age: 63
End: 2023-09-12
Payer: MEDICARE

## 2023-09-12 PROCEDURE — 92012 INTRM OPH EXAM EST PATIENT: CPT

## 2023-09-21 ENCOUNTER — NON-APPOINTMENT (OUTPATIENT)
Age: 63
End: 2023-09-21

## 2023-09-21 ENCOUNTER — TRANSCRIPTION ENCOUNTER (OUTPATIENT)
Age: 63
End: 2023-09-21

## 2023-09-21 ENCOUNTER — APPOINTMENT (OUTPATIENT)
Dept: INTERNAL MEDICINE | Facility: CLINIC | Age: 63
End: 2023-09-21
Payer: MEDICARE

## 2023-09-21 DIAGNOSIS — U07.1 COVID-19: ICD-10-CM

## 2023-09-21 DIAGNOSIS — R73.09 OTHER ABNORMAL GLUCOSE: ICD-10-CM

## 2023-09-21 PROCEDURE — 99442: CPT

## 2023-09-22 ENCOUNTER — TRANSCRIPTION ENCOUNTER (OUTPATIENT)
Age: 63
End: 2023-09-22

## 2023-09-27 ENCOUNTER — TRANSCRIPTION ENCOUNTER (OUTPATIENT)
Age: 63
End: 2023-09-27

## 2023-10-03 ENCOUNTER — APPOINTMENT (OUTPATIENT)
Dept: OPHTHALMOLOGY | Facility: CLINIC | Age: 63
End: 2023-10-03
Payer: MEDICARE

## 2023-10-03 ENCOUNTER — NON-APPOINTMENT (OUTPATIENT)
Age: 63
End: 2023-10-03

## 2023-10-03 PROCEDURE — 92133 CPTRZD OPH DX IMG PST SGM ON: CPT

## 2023-10-03 PROCEDURE — 92012 INTRM OPH EXAM EST PATIENT: CPT

## 2023-10-03 PROCEDURE — 92083 EXTENDED VISUAL FIELD XM: CPT

## 2023-10-09 ENCOUNTER — RX RENEWAL (OUTPATIENT)
Age: 63
End: 2023-10-09

## 2023-10-23 ENCOUNTER — NON-APPOINTMENT (OUTPATIENT)
Age: 63
End: 2023-10-23

## 2023-10-23 ENCOUNTER — APPOINTMENT (OUTPATIENT)
Dept: OPHTHALMOLOGY | Facility: CLINIC | Age: 63
End: 2023-10-23
Payer: MEDICARE

## 2023-10-23 PROCEDURE — 92012 INTRM OPH EXAM EST PATIENT: CPT

## 2023-11-16 ENCOUNTER — TRANSCRIPTION ENCOUNTER (OUTPATIENT)
Age: 63
End: 2023-11-16

## 2024-01-16 ENCOUNTER — APPOINTMENT (OUTPATIENT)
Dept: INTERNAL MEDICINE | Facility: CLINIC | Age: 64
End: 2024-01-16
Payer: MEDICARE

## 2024-01-16 VITALS
RESPIRATION RATE: 18 BRPM | WEIGHT: 207 LBS | DIASTOLIC BLOOD PRESSURE: 83 MMHG | OXYGEN SATURATION: 98 % | BODY MASS INDEX: 29.63 KG/M2 | HEART RATE: 93 BPM | HEIGHT: 70 IN | SYSTOLIC BLOOD PRESSURE: 138 MMHG | TEMPERATURE: 98 F

## 2024-01-16 DIAGNOSIS — Z00.00 ENCOUNTER FOR GENERAL ADULT MEDICAL EXAMINATION W/OUT ABNORMAL FINDINGS: ICD-10-CM

## 2024-01-16 PROCEDURE — 36415 COLL VENOUS BLD VENIPUNCTURE: CPT

## 2024-01-16 PROCEDURE — G0439: CPT

## 2024-01-16 RX ORDER — NIRMATRELVIR AND RITONAVIR 300-100 MG
20 X 150 MG & KIT ORAL
Qty: 1 | Refills: 0 | Status: DISCONTINUED | COMMUNITY
Start: 2023-09-22 | End: 2024-01-16

## 2024-01-16 NOTE — ASSESSMENT
[FreeTextEntry1] : 64 year old male found to have stable Essential Hypertension, Hypercholesterolemia, Elevated Hemoglobin A1c, Vitamin D Deficiency, BPH without Urinary Obstruction, with the current prescription regimen as recommended, diet and lifestyle modifications, as counseled. Prior results reviewed, interpreted and discussed with the patient during today's examination, as appropriate. Follow up, treatment plan and tests, as ordered.

## 2024-01-16 NOTE — HEALTH RISK ASSESSMENT
[Good] : ~his/her~  mood as  good [No] : In the past 12 months have you used drugs other than those required for medical reasons? No [No falls in past year] : Patient reported no falls in the past year [0] : 2) Feeling down, depressed, or hopeless: Not at all (0) [Patient reported colonoscopy was normal] : Patient reported colonoscopy was normal [HIV test declined] : HIV test declined [None] : None [Feels Safe at Home] : Feels safe at home [Fully functional (bathing, dressing, toileting, transferring, walking, feeding)] : Fully functional (bathing, dressing, toileting, transferring, walking, feeding) [Fully functional (using the telephone, shopping, preparing meals, housekeeping, doing laundry, using] : Fully functional and needs no help or supervision to perform IADLs (using the telephone, shopping, preparing meals, housekeeping, doing laundry, using transportation, managing medications and managing finances) [Smoke Detector] : smoke detector [Carbon Monoxide Detector] : carbon monoxide detector [Seat Belt] :  uses seat belt [Sunscreen] : uses sunscreen [With Patient/Caregiver] : , with patient/caregiver [Never] : Never [FreeTextEntry1] : Check up\par   [de-identified] : URO/OPHTH [[id number]] : 0 [Change in mental status noted] : No change in mental status noted [Reports changes in hearing] : Reports no changes in hearing [Reports changes in vision] : Reports no changes in vision [Reports changes in dental health] : Reports no changes in dental health [ColonoscopyDate] : 10/20 [HepatitisCDate] : 02/20 [HepatitisCComments] : Negative [AdvancecareDate] : 01/24

## 2024-01-17 ENCOUNTER — TRANSCRIPTION ENCOUNTER (OUTPATIENT)
Age: 64
End: 2024-01-17

## 2024-01-17 LAB
ALBUMIN SERPL ELPH-MCNC: 4.3 G/DL
ALP BLD-CCNC: 89 U/L
ALT SERPL-CCNC: 13 U/L
ANION GAP SERPL CALC-SCNC: 12 MMOL/L
AST SERPL-CCNC: 18 U/L
BASOPHILS # BLD AUTO: 0.05 K/UL
BASOPHILS NFR BLD AUTO: 0.9 %
BILIRUB SERPL-MCNC: 0.5 MG/DL
BUN SERPL-MCNC: 6 MG/DL
CALCIUM SERPL-MCNC: 9.1 MG/DL
CHLORIDE SERPL-SCNC: 102 MMOL/L
CHOLEST SERPL-MCNC: 153 MG/DL
CO2 SERPL-SCNC: 26 MMOL/L
CREAT SERPL-MCNC: 0.94 MG/DL
EGFR: 89 ML/MIN/1.73M2
EOSINOPHIL # BLD AUTO: 0.14 K/UL
EOSINOPHIL NFR BLD AUTO: 2.6 %
ESTIMATED AVERAGE GLUCOSE: 123 MG/DL
GGT SERPL-CCNC: 12 U/L
GLUCOSE SERPL-MCNC: 97 MG/DL
HBA1C MFR BLD HPLC: 5.9 %
HCT VFR BLD CALC: 43.9 %
HDLC SERPL-MCNC: 42 MG/DL
HGB BLD-MCNC: 14.4 G/DL
IMM GRANULOCYTES NFR BLD AUTO: 0.2 %
LDLC SERPL CALC-MCNC: 89 MG/DL
LYMPHOCYTES # BLD AUTO: 2.5 K/UL
LYMPHOCYTES NFR BLD AUTO: 47 %
MAN DIFF?: NORMAL
MCHC RBC-ENTMCNC: 30.1 PG
MCHC RBC-ENTMCNC: 32.8 GM/DL
MCV RBC AUTO: 91.6 FL
MONOCYTES # BLD AUTO: 0.37 K/UL
MONOCYTES NFR BLD AUTO: 7 %
NEUTROPHILS # BLD AUTO: 2.25 K/UL
NEUTROPHILS NFR BLD AUTO: 42.3 %
NONHDLC SERPL-MCNC: 111 MG/DL
PLATELET # BLD AUTO: 189 K/UL
POTASSIUM SERPL-SCNC: 4.9 MMOL/L
PROT SERPL-MCNC: 7.1 G/DL
PSA FREE FLD-MCNC: 15 %
PSA FREE SERPL-MCNC: 0.92 NG/ML
PSA SERPL-MCNC: 6.28 NG/ML
RBC # BLD: 4.79 M/UL
RBC # FLD: 13.2 %
SODIUM SERPL-SCNC: 139 MMOL/L
TRIGL SERPL-MCNC: 123 MG/DL
WBC # FLD AUTO: 5.32 K/UL

## 2024-01-25 ENCOUNTER — APPOINTMENT (OUTPATIENT)
Dept: UROLOGY | Facility: CLINIC | Age: 64
End: 2024-01-25
Payer: MEDICARE

## 2024-01-25 DIAGNOSIS — N40.0 BENIGN PROSTATIC HYPERPLASIA WITHOUT LOWER URINARY TRACT SYMPMS: ICD-10-CM

## 2024-01-25 DIAGNOSIS — R97.20 ELEVATED PROSTATE, SPECIFIC ANTIGEN [PSA]: ICD-10-CM

## 2024-01-25 LAB
APPEARANCE: CLEAR
BILIRUBIN URINE: NEGATIVE
BLOOD URINE: NEGATIVE
COLOR: YELLOW
GLUCOSE QUALITATIVE U: NEGATIVE MG/DL
KETONES URINE: NEGATIVE MG/DL
LEUKOCYTE ESTERASE URINE: NEGATIVE
NITRITE URINE: NEGATIVE
PH URINE: 7.5
PROTEIN URINE: NEGATIVE MG/DL
PSA FREE FLD-MCNC: 17 %
PSA FREE SERPL-MCNC: 0.79 NG/ML
PSA SERPL-MCNC: 4.6 NG/ML
SPECIFIC GRAVITY URINE: <1.005
UROBILINOGEN URINE: 0.2 MG/DL

## 2024-01-25 PROCEDURE — 99214 OFFICE O/P EST MOD 30 MIN: CPT

## 2024-01-25 PROCEDURE — G2211 COMPLEX E/M VISIT ADD ON: CPT

## 2024-02-08 ENCOUNTER — TRANSCRIPTION ENCOUNTER (OUTPATIENT)
Age: 64
End: 2024-02-08

## 2024-02-15 ENCOUNTER — APPOINTMENT (OUTPATIENT)
Dept: OPHTHALMOLOGY | Facility: CLINIC | Age: 64
End: 2024-02-15
Payer: MEDICARE

## 2024-02-15 ENCOUNTER — NON-APPOINTMENT (OUTPATIENT)
Age: 64
End: 2024-02-15

## 2024-02-15 PROCEDURE — 92285 EXTERNAL OCULAR PHOTOGRAPHY: CPT

## 2024-02-15 PROCEDURE — 92012 INTRM OPH EXAM EST PATIENT: CPT

## 2024-03-11 ENCOUNTER — APPOINTMENT (OUTPATIENT)
Dept: OPHTHALMOLOGY | Facility: CLINIC | Age: 64
End: 2024-03-11
Payer: MEDICARE

## 2024-03-11 ENCOUNTER — NON-APPOINTMENT (OUTPATIENT)
Age: 64
End: 2024-03-11

## 2024-03-11 PROCEDURE — 92012 INTRM OPH EXAM EST PATIENT: CPT

## 2024-04-12 ENCOUNTER — APPOINTMENT (OUTPATIENT)
Dept: CARDIOLOGY | Facility: CLINIC | Age: 64
End: 2024-04-12
Payer: MEDICARE

## 2024-04-12 VITALS
BODY MASS INDEX: 28.63 KG/M2 | HEIGHT: 70 IN | WEIGHT: 200 LBS | OXYGEN SATURATION: 96 % | SYSTOLIC BLOOD PRESSURE: 112 MMHG | HEART RATE: 74 BPM | DIASTOLIC BLOOD PRESSURE: 73 MMHG

## 2024-04-12 DIAGNOSIS — I10 ESSENTIAL (PRIMARY) HYPERTENSION: ICD-10-CM

## 2024-04-12 DIAGNOSIS — E78.00 PURE HYPERCHOLESTEROLEMIA, UNSPECIFIED: ICD-10-CM

## 2024-04-12 DIAGNOSIS — Z82.49 FAMILY HISTORY OF ISCHEMIC HEART DISEASE AND OTHER DISEASES OF THE CIRCULATORY SYSTEM: ICD-10-CM

## 2024-04-12 PROCEDURE — 99214 OFFICE O/P EST MOD 30 MIN: CPT

## 2024-04-12 PROCEDURE — 93000 ELECTROCARDIOGRAM COMPLETE: CPT

## 2024-04-12 RX ORDER — BRIMONIDINE TARTRATE 1 MG/ML
0.1 SOLUTION/ DROPS OPHTHALMIC
Refills: 0 | Status: ACTIVE | COMMUNITY
Start: 2024-04-12

## 2024-04-12 RX ORDER — LATANOPROST/PF 0.005 %
0.01 DROPS OPHTHALMIC (EYE)
Refills: 0 | Status: DISCONTINUED | COMMUNITY
End: 2024-04-12

## 2024-04-12 RX ORDER — BIMATOPROST 0.1 MG/ML
0.01 SOLUTION/ DROPS OPHTHALMIC
Refills: 0 | Status: ACTIVE | COMMUNITY
Start: 2024-04-12

## 2024-04-12 NOTE — REASON FOR VISIT
[Symptom and Test Evaluation] : symptom and test evaluation [Spouse] : spouse [Follow-Up - Clinic] : a clinic follow-up of [Chest Pain] : chest pain

## 2024-04-15 NOTE — CARDIOLOGY SUMMARY
[___] : [unfilled] [de-identified] : IMPRESSIONS:Abnormal Study * Exercise capacity: 10 METS, Good for age and gender. * Chest Pain: No chest pain with exercise. * Symptom: Fatigue. * HR Response: Appropriate. * BP Response: Appropriate. * Heart Rhythm: Sinus Rhythm - 61 BPM. * Baseline ECG: No significant ST abnormalities. * ECG Changes: ST Depression: 0.5 mm upsloping in leads V4-V6 started at 09:00 min of exercise at HR of 150 and persisted 01:00 min into recovery.  These changes did not meet ischemic criteria. * Arrhythmia: Occasional VPDs occurred during stress. * The left ventricle was normal in size. There are small, mild defects in the basal inferior, basal inferoseptal walls that are predominantly fixed, suggestive of scar. * Post-stress gated wall motion analysis was performed (LVEF = 59 %;LVEDV = 100 ml.) Reduced systolic thickening of the basal inferior and basal inferoseptal walls. ------------------------------------------------------------------------ Confirmed on  9/1/2021 - 17:18:07 by Josue Lopez MD ---------------------------------------------------------

## 2024-04-15 NOTE — END OF VISIT
[Time Spent: ___ minutes] : I have spent [unfilled] minutes of time on the encounter. [FreeTextEntry3] : I, Dr. Natalia Cox, personally performed the evaluation and management (E/M) services for this established patient who presents today with (a) new problem(s)/exacerbation of (an) existing condition(s).  That E/M includes conducting the examination, assessing all new/exacerbated conditions, and establishing a new plan of care.  Today, my ACP, was here to observe my evaluation and management services for this new problem/exacerbated condition to be followed going forward.

## 2024-04-15 NOTE — DISCUSSION/SUMMARY
[FreeTextEntry1] : In summary, Mr. Anjali waters a is a 61-year-old male with history of hypertension, hypercholesteremia, prediabetes, significant family history of premature coronary artery diseas presents in for follow up.  # HLD , , HDL 42, LDL 89 Encouraged patient to continue healthy exercise and eating habits, focusing on a Mediterranean style of eating and aiming for the recommended 150 minutes per week of moderate physical activity.  # CV risk assessment: Calcium score to risk stratify CAD

## 2024-04-15 NOTE — HISTORY OF PRESENT ILLNESS
[FreeTextEntry1] : Patient present in for follow up - feels well. Patient's brother suffered CVA x 2 and was told has non obstructive coronary disease. Patient was unable to do any exercises for the past 4 months due to fall and sustained fracture to his left ankle.  Mr. Alba is a is a 61-year-old male with history of hypertension, hypercholesteremia, prediabetes, significant family history of premature coronary artery disease presented to re-establish. Stress test and echocardiogram performed in 2021 was WNL.   # HLD , , HDL 42, LDL 89 Encouraged patient to continue healthy exercise and eating habits, focusing on a Mediterranean style of eating and aiming for the recommended 150 minutes per week of moderate physical activity.  # CV risk assessment: Calcium score to risk stratify CAD

## 2024-05-03 ENCOUNTER — APPOINTMENT (OUTPATIENT)
Dept: CT IMAGING | Facility: CLINIC | Age: 64
End: 2024-05-03
Payer: MEDICARE

## 2024-05-03 DIAGNOSIS — I25.10 ATHEROSCLEROTIC HEART DISEASE OF NATIVE CORONARY ARTERY W/OUT ANGINA PECTORIS: ICD-10-CM

## 2024-05-03 PROCEDURE — 75574 CT ANGIO HRT W/3D IMAGE: CPT

## 2024-05-03 RX ORDER — ASPIRIN 81 MG/1
81 TABLET, CHEWABLE ORAL
Qty: 90 | Refills: 3 | Status: ACTIVE | COMMUNITY
Start: 2024-05-03 | End: 1900-01-01

## 2024-06-04 ENCOUNTER — APPOINTMENT (OUTPATIENT)
Dept: OPHTHALMOLOGY | Facility: CLINIC | Age: 64
End: 2024-06-04
Payer: MEDICARE

## 2024-06-04 ENCOUNTER — NON-APPOINTMENT (OUTPATIENT)
Age: 64
End: 2024-06-04

## 2024-06-04 PROCEDURE — 92250 FUNDUS PHOTOGRAPHY W/I&R: CPT

## 2024-06-04 PROCEDURE — 92014 COMPRE OPH EXAM EST PT 1/>: CPT

## 2024-07-09 ENCOUNTER — RX RENEWAL (OUTPATIENT)
Age: 64
End: 2024-07-09

## 2024-08-09 NOTE — HISTORY OF PRESENT ILLNESS
Hpi Title: Evaluation of Skin Lesions
Additional History: Pt has history of DN and AKs. Denies any concerning lesions.
[de-identified] : 64 year old male patient with history of stable Essential Hypertension, Hypercholesterolemia, Elevated Hemoglobin A1c, Vitamin D Deficiency, BPH without Urinary Obstruction, history as stated, presented for an annual preventative examination.

## 2024-10-08 ENCOUNTER — APPOINTMENT (OUTPATIENT)
Dept: FAMILY MEDICINE | Facility: CLINIC | Age: 64
End: 2024-10-08

## 2024-10-08 VITALS
HEART RATE: 90 BPM | RESPIRATION RATE: 16 BRPM | BODY MASS INDEX: 24.16 KG/M2 | WEIGHT: 145 LBS | SYSTOLIC BLOOD PRESSURE: 124 MMHG | HEIGHT: 65 IN | DIASTOLIC BLOOD PRESSURE: 84 MMHG | OXYGEN SATURATION: 98 % | TEMPERATURE: 97.8 F

## 2024-10-08 DIAGNOSIS — B02.30 ZOSTER OCULAR DISEASE, UNSPECIFIED: ICD-10-CM

## 2024-10-15 ENCOUNTER — NON-APPOINTMENT (OUTPATIENT)
Age: 64
End: 2024-10-15

## 2024-10-15 ENCOUNTER — APPOINTMENT (OUTPATIENT)
Dept: OPHTHALMOLOGY | Facility: CLINIC | Age: 64
End: 2024-10-15
Payer: MEDICARE

## 2024-10-15 PROCEDURE — 92133 CPTRZD OPH DX IMG PST SGM ON: CPT

## 2024-10-15 PROCEDURE — 92012 INTRM OPH EXAM EST PATIENT: CPT

## 2024-10-15 PROCEDURE — 92083 EXTENDED VISUAL FIELD XM: CPT

## 2024-10-24 ENCOUNTER — NON-APPOINTMENT (OUTPATIENT)
Age: 64
End: 2024-10-24

## 2024-10-24 ENCOUNTER — APPOINTMENT (OUTPATIENT)
Dept: INTERNAL MEDICINE | Facility: CLINIC | Age: 64
End: 2024-10-24
Payer: MEDICARE

## 2024-10-24 VITALS
WEIGHT: 210 LBS | SYSTOLIC BLOOD PRESSURE: 124 MMHG | BODY MASS INDEX: 34.99 KG/M2 | RESPIRATION RATE: 16 BRPM | HEART RATE: 85 BPM | OXYGEN SATURATION: 97 % | DIASTOLIC BLOOD PRESSURE: 75 MMHG | HEIGHT: 65 IN | TEMPERATURE: 97.7 F

## 2024-10-24 DIAGNOSIS — N40.0 BENIGN PROSTATIC HYPERPLASIA WITHOUT LOWER URINARY TRACT SYMPMS: ICD-10-CM

## 2024-10-24 DIAGNOSIS — E78.00 PURE HYPERCHOLESTEROLEMIA, UNSPECIFIED: ICD-10-CM

## 2024-10-24 DIAGNOSIS — I10 ESSENTIAL (PRIMARY) HYPERTENSION: ICD-10-CM

## 2024-10-24 DIAGNOSIS — Z23 ENCOUNTER FOR IMMUNIZATION: ICD-10-CM

## 2024-10-24 DIAGNOSIS — R73.09 OTHER ABNORMAL GLUCOSE: ICD-10-CM

## 2024-10-24 PROCEDURE — 93000 ELECTROCARDIOGRAM COMPLETE: CPT

## 2024-10-24 PROCEDURE — 99213 OFFICE O/P EST LOW 20 MIN: CPT

## 2024-10-28 ENCOUNTER — TRANSCRIPTION ENCOUNTER (OUTPATIENT)
Age: 64
End: 2024-10-28

## 2024-10-28 LAB
ALBUMIN SERPL ELPH-MCNC: 4.3 G/DL
ALP BLD-CCNC: 81 U/L
ALT SERPL-CCNC: 16 U/L
ANION GAP SERPL CALC-SCNC: 12 MMOL/L
AST SERPL-CCNC: 17 U/L
BASOPHILS # BLD AUTO: 0.05 K/UL
BASOPHILS NFR BLD AUTO: 0.8 %
BILIRUB SERPL-MCNC: 0.5 MG/DL
BUN SERPL-MCNC: 10 MG/DL
CALCIUM SERPL-MCNC: 9.7 MG/DL
CHLORIDE SERPL-SCNC: 104 MMOL/L
CHOLEST SERPL-MCNC: 162 MG/DL
CO2 SERPL-SCNC: 28 MMOL/L
CREAT SERPL-MCNC: 1.04 MG/DL
EGFR: 79 ML/MIN/1.73M2
EOSINOPHIL # BLD AUTO: 0.21 K/UL
EOSINOPHIL NFR BLD AUTO: 3.2 %
ESTIMATED AVERAGE GLUCOSE: 128 MG/DL
GGT SERPL-CCNC: 13 U/L
GLUCOSE SERPL-MCNC: 118 MG/DL
HBA1C MFR BLD HPLC: 6.1 %
HCT VFR BLD CALC: 46.2 %
HDLC SERPL-MCNC: 39 MG/DL
HGB BLD-MCNC: 15.1 G/DL
IMM GRANULOCYTES NFR BLD AUTO: 0.2 %
LDLC SERPL CALC-MCNC: 96 MG/DL
LYMPHOCYTES # BLD AUTO: 3.22 K/UL
LYMPHOCYTES NFR BLD AUTO: 49.1 %
MAN DIFF?: NORMAL
MCHC RBC-ENTMCNC: 30 PG
MCHC RBC-ENTMCNC: 32.7 GM/DL
MCV RBC AUTO: 91.7 FL
MONOCYTES # BLD AUTO: 0.43 K/UL
MONOCYTES NFR BLD AUTO: 6.6 %
NEUTROPHILS # BLD AUTO: 2.64 K/UL
NEUTROPHILS NFR BLD AUTO: 40.1 %
NONHDLC SERPL-MCNC: 123 MG/DL
PLATELET # BLD AUTO: 187 K/UL
POTASSIUM SERPL-SCNC: 5.1 MMOL/L
PROT SERPL-MCNC: 6.9 G/DL
PSA FREE FLD-MCNC: 15 %
PSA FREE SERPL-MCNC: 0.89 NG/ML
PSA SERPL-MCNC: 5.77 NG/ML
RBC # BLD: 5.04 M/UL
RBC # FLD: 13.2 %
SODIUM SERPL-SCNC: 144 MMOL/L
TRIGL SERPL-MCNC: 155 MG/DL
TSH SERPL-ACNC: 2.14 UIU/ML
WBC # FLD AUTO: 6.56 K/UL

## 2024-10-29 ENCOUNTER — TRANSCRIPTION ENCOUNTER (OUTPATIENT)
Age: 64
End: 2024-10-29

## 2024-11-19 ENCOUNTER — OUTPATIENT (OUTPATIENT)
Dept: OUTPATIENT SERVICES | Facility: HOSPITAL | Age: 64
LOS: 1 days | End: 2024-11-19
Payer: COMMERCIAL

## 2024-11-19 ENCOUNTER — RESULT REVIEW (OUTPATIENT)
Age: 64
End: 2024-11-19

## 2024-11-19 ENCOUNTER — APPOINTMENT (OUTPATIENT)
Dept: MRI IMAGING | Facility: CLINIC | Age: 64
End: 2024-11-19
Payer: MEDICARE

## 2024-11-19 DIAGNOSIS — Z98.890 OTHER SPECIFIED POSTPROCEDURAL STATES: Chronic | ICD-10-CM

## 2024-11-19 DIAGNOSIS — R97.20 ELEVATED PROSTATE SPECIFIC ANTIGEN [PSA]: ICD-10-CM

## 2024-11-19 PROCEDURE — 72197 MRI PELVIS W/O & W/DYE: CPT | Mod: 26,76

## 2024-11-19 PROCEDURE — 72197 MRI PELVIS W/O & W/DYE: CPT

## 2024-11-19 PROCEDURE — 76498 UNLISTED MR PROCEDURE: CPT

## 2024-11-19 PROCEDURE — A9585: CPT

## 2024-11-19 PROCEDURE — 76498P: CUSTOM | Mod: 26

## 2024-11-21 NOTE — H&P PST ADULT - NS SC CAGE ALCOHOL ANNOYED YOU
no
- P/w Na 124  - Has hx of hypovolemic hyponatremia  - ACTH, AM cortisol wnl last admission a month ago  - Will trial 500mL NS bolus

## 2024-12-18 ENCOUNTER — TRANSCRIPTION ENCOUNTER (OUTPATIENT)
Age: 64
End: 2024-12-18

## 2024-12-24 PROBLEM — F10.90 ALCOHOL USE: Status: INACTIVE | Noted: 2017-12-26

## 2024-12-30 NOTE — PROVIDER CONTACT NOTE (OTHER) - ASSESSMENT
Patient arrives via [facility] after being found outside of a [employer]. Per EMS staff noticed patient wandering around outside their store. Upon EMS arrival patient c/o \"groin pain\" and wanting to be seen in the ER.   Pt admitted to alcohol use today, unsure of the last drink.   - Patient is to be NWB to the left foot   - PT evaluation in AM   - If pain is controlled, consider discharge in the afternoon   - Will evaluate tomorrow morning

## 2025-02-28 ENCOUNTER — NON-APPOINTMENT (OUTPATIENT)
Age: 65
End: 2025-02-28

## 2025-03-17 ENCOUNTER — APPOINTMENT (OUTPATIENT)
Dept: OPHTHALMOLOGY | Facility: CLINIC | Age: 65
End: 2025-03-17
Payer: MEDICARE

## 2025-03-17 ENCOUNTER — NON-APPOINTMENT (OUTPATIENT)
Age: 65
End: 2025-03-17

## 2025-03-17 PROCEDURE — 92012 INTRM OPH EXAM EST PATIENT: CPT

## 2025-04-01 ENCOUNTER — NON-APPOINTMENT (OUTPATIENT)
Age: 65
End: 2025-04-01

## 2025-04-01 ENCOUNTER — APPOINTMENT (OUTPATIENT)
Dept: OPHTHALMOLOGY | Facility: CLINIC | Age: 65
End: 2025-04-01
Payer: MEDICARE

## 2025-04-01 PROCEDURE — 92012 INTRM OPH EXAM EST PATIENT: CPT

## 2025-04-08 ENCOUNTER — NON-APPOINTMENT (OUTPATIENT)
Age: 65
End: 2025-04-08

## 2025-04-08 ENCOUNTER — APPOINTMENT (OUTPATIENT)
Dept: OPHTHALMOLOGY | Facility: CLINIC | Age: 65
End: 2025-04-08
Payer: MEDICARE

## 2025-04-08 PROCEDURE — 92012 INTRM OPH EXAM EST PATIENT: CPT

## 2025-04-17 ENCOUNTER — APPOINTMENT (OUTPATIENT)
Dept: INTERNAL MEDICINE | Facility: CLINIC | Age: 65
End: 2025-04-17
Payer: MEDICARE

## 2025-04-17 VITALS
RESPIRATION RATE: 16 BRPM | SYSTOLIC BLOOD PRESSURE: 116 MMHG | HEIGHT: 65 IN | BODY MASS INDEX: 35.99 KG/M2 | HEART RATE: 75 BPM | DIASTOLIC BLOOD PRESSURE: 71 MMHG | TEMPERATURE: 97.3 F | OXYGEN SATURATION: 97 % | WEIGHT: 216 LBS

## 2025-04-17 DIAGNOSIS — Z00.00 ENCOUNTER FOR GENERAL ADULT MEDICAL EXAMINATION W/OUT ABNORMAL FINDINGS: ICD-10-CM

## 2025-04-17 PROCEDURE — G0439: CPT

## 2025-05-12 ENCOUNTER — APPOINTMENT (OUTPATIENT)
Dept: OPHTHALMOLOGY | Facility: CLINIC | Age: 65
End: 2025-05-12
Payer: MEDICARE

## 2025-05-12 ENCOUNTER — NON-APPOINTMENT (OUTPATIENT)
Age: 65
End: 2025-05-12

## 2025-05-12 PROCEDURE — 92012 INTRM OPH EXAM EST PATIENT: CPT

## 2025-06-25 ENCOUNTER — TRANSCRIPTION ENCOUNTER (OUTPATIENT)
Age: 65
End: 2025-06-25

## 2025-07-07 ENCOUNTER — APPOINTMENT (OUTPATIENT)
Dept: OPHTHALMOLOGY | Facility: CLINIC | Age: 65
End: 2025-07-07
Payer: MEDICARE

## 2025-07-07 ENCOUNTER — NON-APPOINTMENT (OUTPATIENT)
Age: 65
End: 2025-07-07

## 2025-07-07 PROCEDURE — 92014 COMPRE OPH EXAM EST PT 1/>: CPT

## 2025-07-07 PROCEDURE — 92020 GONIOSCOPY: CPT

## 2025-07-07 PROCEDURE — 92133 CPTRZD OPH DX IMG PST SGM ON: CPT

## 2025-07-28 ENCOUNTER — NON-APPOINTMENT (OUTPATIENT)
Age: 65
End: 2025-07-28

## 2025-07-30 ENCOUNTER — APPOINTMENT (OUTPATIENT)
Dept: CARDIOLOGY | Facility: CLINIC | Age: 65
End: 2025-07-30
Payer: MEDICARE

## 2025-07-30 VITALS
SYSTOLIC BLOOD PRESSURE: 121 MMHG | OXYGEN SATURATION: 95 % | WEIGHT: 203 LBS | HEART RATE: 65 BPM | HEIGHT: 65 IN | DIASTOLIC BLOOD PRESSURE: 80 MMHG | BODY MASS INDEX: 33.82 KG/M2

## 2025-07-30 DIAGNOSIS — I10 ESSENTIAL (PRIMARY) HYPERTENSION: ICD-10-CM

## 2025-07-30 DIAGNOSIS — I25.10 ATHEROSCLEROTIC HEART DISEASE OF NATIVE CORONARY ARTERY W/OUT ANGINA PECTORIS: ICD-10-CM

## 2025-07-30 DIAGNOSIS — E66.811 OBESITY, CLASS 1: ICD-10-CM

## 2025-07-30 DIAGNOSIS — E78.00 PURE HYPERCHOLESTEROLEMIA, UNSPECIFIED: ICD-10-CM

## 2025-07-30 PROCEDURE — 99215 OFFICE O/P EST HI 40 MIN: CPT

## 2025-07-30 PROCEDURE — 93000 ELECTROCARDIOGRAM COMPLETE: CPT

## 2025-07-30 PROCEDURE — G2211 COMPLEX E/M VISIT ADD ON: CPT

## 2025-07-31 ENCOUNTER — TRANSCRIPTION ENCOUNTER (OUTPATIENT)
Age: 65
End: 2025-07-31

## 2025-07-31 RX ORDER — TIRZEPATIDE 2.5 MG/.5ML
2.5 INJECTION, SOLUTION SUBCUTANEOUS
Qty: 4 | Refills: 4 | Status: ACTIVE | COMMUNITY
Start: 2025-07-30 | End: 1900-01-01

## 2025-08-01 ENCOUNTER — TRANSCRIPTION ENCOUNTER (OUTPATIENT)
Age: 65
End: 2025-08-01

## 2025-08-04 ENCOUNTER — TRANSCRIPTION ENCOUNTER (OUTPATIENT)
Age: 65
End: 2025-08-04

## 2025-08-28 ENCOUNTER — TRANSCRIPTION ENCOUNTER (OUTPATIENT)
Age: 65
End: 2025-08-28

## 2025-09-10 ENCOUNTER — APPOINTMENT (OUTPATIENT)
Dept: CARDIOLOGY | Facility: CLINIC | Age: 65
End: 2025-09-10

## 2025-09-10 VITALS
HEIGHT: 65 IN | BODY MASS INDEX: 33.32 KG/M2 | HEART RATE: 69 BPM | SYSTOLIC BLOOD PRESSURE: 118 MMHG | DIASTOLIC BLOOD PRESSURE: 77 MMHG | OXYGEN SATURATION: 98 % | WEIGHT: 200 LBS

## 2025-09-10 PROCEDURE — 99214 OFFICE O/P EST MOD 30 MIN: CPT
